# Patient Record
Sex: FEMALE | Race: BLACK OR AFRICAN AMERICAN | Employment: UNEMPLOYED | URBAN - METROPOLITAN AREA
[De-identification: names, ages, dates, MRNs, and addresses within clinical notes are randomized per-mention and may not be internally consistent; named-entity substitution may affect disease eponyms.]

---

## 2017-03-01 PROBLEM — R22.32 AXILLARY MASS, LEFT: Status: ACTIVE | Noted: 2017-03-01

## 2017-03-01 PROBLEM — N63.0 BREAST MASS IN FEMALE: Status: ACTIVE | Noted: 2017-03-01

## 2017-03-02 PROCEDURE — 88360 TUMOR IMMUNOHISTOCHEM/MANUAL: CPT | Performed by: RADIOLOGY

## 2017-03-02 PROCEDURE — 88305 TISSUE EXAM BY PATHOLOGIST: CPT | Performed by: RADIOLOGY

## 2017-03-02 PROCEDURE — 88377 M/PHMTRC ALYS ISHQUANT/SEMIQ: CPT | Performed by: RADIOLOGY

## 2017-03-03 PROBLEM — C50.912 BREAST CANCER, STAGE 3, LEFT (HCC): Status: ACTIVE | Noted: 2017-03-03

## 2017-03-14 PROBLEM — C50.912 MALIGNANT NEOPLASM OF LEFT FEMALE BREAST, UNSPECIFIED SITE OF BREAST: Status: ACTIVE | Noted: 2017-03-14

## 2017-03-20 ENCOUNTER — APPOINTMENT (OUTPATIENT)
Dept: GENERAL RADIOLOGY | Facility: HOSPITAL | Age: 42
End: 2017-03-20
Attending: SURGERY
Payer: COMMERCIAL

## 2017-03-20 ENCOUNTER — SURGERY (OUTPATIENT)
Age: 42
End: 2017-03-20

## 2017-03-20 ENCOUNTER — ANESTHESIA (OUTPATIENT)
Dept: SURGERY | Facility: HOSPITAL | Age: 42
End: 2017-03-20
Payer: COMMERCIAL

## 2017-03-20 ENCOUNTER — ANESTHESIA EVENT (OUTPATIENT)
Dept: SURGERY | Facility: HOSPITAL | Age: 42
End: 2017-03-20
Payer: COMMERCIAL

## 2017-03-20 ENCOUNTER — HOSPITAL ENCOUNTER (OUTPATIENT)
Facility: HOSPITAL | Age: 42
Setting detail: HOSPITAL OUTPATIENT SURGERY
Discharge: HOME | End: 2017-03-20
Attending: SURGERY | Admitting: SURGERY
Payer: COMMERCIAL

## 2017-03-20 VITALS
OXYGEN SATURATION: 97 % | SYSTOLIC BLOOD PRESSURE: 142 MMHG | TEMPERATURE: 98 F | HEART RATE: 56 BPM | HEIGHT: 68 IN | BODY MASS INDEX: 44.41 KG/M2 | RESPIRATION RATE: 16 BRPM | DIASTOLIC BLOOD PRESSURE: 55 MMHG | WEIGHT: 293 LBS

## 2017-03-20 DIAGNOSIS — C50.912 MALIGNANT NEOPLASM OF LEFT FEMALE BREAST, UNSPECIFIED SITE OF BREAST: Primary | ICD-10-CM

## 2017-03-20 LAB — B-HCG UR QL: NEGATIVE

## 2017-03-20 PROCEDURE — 0JH60XZ INSERTION OF TUNNELED VASCULAR ACCESS DEVICE INTO CHEST SUBCUTANEOUS TISSUE AND FASCIA, OPEN APPROACH: ICD-10-PCS | Performed by: SURGERY

## 2017-03-20 PROCEDURE — 81025 URINE PREGNANCY TEST: CPT

## 2017-03-20 PROCEDURE — B5181ZA FLUOROSCOPY OF SUPERIOR VENA CAVA USING LOW OSMOLAR CONTRAST, GUIDANCE: ICD-10-PCS | Performed by: SURGERY

## 2017-03-20 PROCEDURE — 77001 FLUOROGUIDE FOR VEIN DEVICE: CPT

## 2017-03-20 PROCEDURE — 71010 XR CHEST AP PORTABLE  (CPT=71010): CPT

## 2017-03-20 PROCEDURE — B548ZZA ULTRASONOGRAPHY OF SUPERIOR VENA CAVA, GUIDANCE: ICD-10-PCS | Performed by: SURGERY

## 2017-03-20 PROCEDURE — 02HV33Z INSERTION OF INFUSION DEVICE INTO SUPERIOR VENA CAVA, PERCUTANEOUS APPROACH: ICD-10-PCS | Performed by: SURGERY

## 2017-03-20 DEVICE — 8F PLASTIC DIGNITY® MID-SIZED CT PORT W/ATTACHABLE CHRONOFLEX® POLYURETHANE CATHETER
Type: IMPLANTABLE DEVICE | Site: CHEST | Status: FUNCTIONAL
Brand: DIGNITY® MID-SIZED CT PORT

## 2017-03-20 RX ORDER — HYDROMORPHONE HYDROCHLORIDE 1 MG/ML
0.4 INJECTION, SOLUTION INTRAMUSCULAR; INTRAVENOUS; SUBCUTANEOUS EVERY 5 MIN PRN
Status: DISCONTINUED | OUTPATIENT
Start: 2017-03-20 | End: 2017-03-20

## 2017-03-20 RX ORDER — HYDROMORPHONE HYDROCHLORIDE 1 MG/ML
0.6 INJECTION, SOLUTION INTRAMUSCULAR; INTRAVENOUS; SUBCUTANEOUS EVERY 5 MIN PRN
Status: DISCONTINUED | OUTPATIENT
Start: 2017-03-20 | End: 2017-03-20

## 2017-03-20 RX ORDER — MORPHINE SULFATE 4 MG/ML
4 INJECTION, SOLUTION INTRAMUSCULAR; INTRAVENOUS EVERY 10 MIN PRN
Status: DISCONTINUED | OUTPATIENT
Start: 2017-03-20 | End: 2017-03-20

## 2017-03-20 RX ORDER — SODIUM CHLORIDE, SODIUM LACTATE, POTASSIUM CHLORIDE, CALCIUM CHLORIDE 600; 310; 30; 20 MG/100ML; MG/100ML; MG/100ML; MG/100ML
INJECTION, SOLUTION INTRAVENOUS CONTINUOUS
Status: DISCONTINUED | OUTPATIENT
Start: 2017-03-20 | End: 2017-03-20

## 2017-03-20 RX ORDER — HALOPERIDOL 5 MG/ML
0.25 INJECTION INTRAMUSCULAR ONCE AS NEEDED
Status: DISCONTINUED | OUTPATIENT
Start: 2017-03-20 | End: 2017-03-20

## 2017-03-20 RX ORDER — ONDANSETRON 2 MG/ML
4 INJECTION INTRAMUSCULAR; INTRAVENOUS ONCE AS NEEDED
Status: DISCONTINUED | OUTPATIENT
Start: 2017-03-20 | End: 2017-03-20

## 2017-03-20 RX ORDER — NALOXONE HYDROCHLORIDE 0.4 MG/ML
80 INJECTION, SOLUTION INTRAMUSCULAR; INTRAVENOUS; SUBCUTANEOUS AS NEEDED
Status: DISCONTINUED | OUTPATIENT
Start: 2017-03-20 | End: 2017-03-20

## 2017-03-20 RX ORDER — MORPHINE SULFATE 2 MG/ML
2 INJECTION, SOLUTION INTRAMUSCULAR; INTRAVENOUS EVERY 10 MIN PRN
Status: DISCONTINUED | OUTPATIENT
Start: 2017-03-20 | End: 2017-03-20

## 2017-03-20 RX ORDER — ONDANSETRON 2 MG/ML
INJECTION INTRAMUSCULAR; INTRAVENOUS AS NEEDED
Status: DISCONTINUED | OUTPATIENT
Start: 2017-03-20 | End: 2017-03-20 | Stop reason: SURG

## 2017-03-20 RX ORDER — LIDOCAINE HYDROCHLORIDE 10 MG/ML
INJECTION, SOLUTION EPIDURAL; INFILTRATION; INTRACAUDAL; PERINEURAL AS NEEDED
Status: DISCONTINUED | OUTPATIENT
Start: 2017-03-20 | End: 2017-03-20 | Stop reason: HOSPADM

## 2017-03-20 RX ORDER — HYDROCODONE BITARTRATE AND ACETAMINOPHEN 5; 325 MG/1; MG/1
2 TABLET ORAL AS NEEDED
Status: DISCONTINUED | OUTPATIENT
Start: 2017-03-20 | End: 2017-03-20

## 2017-03-20 RX ORDER — MIDAZOLAM HYDROCHLORIDE 1 MG/ML
INJECTION INTRAMUSCULAR; INTRAVENOUS AS NEEDED
Status: DISCONTINUED | OUTPATIENT
Start: 2017-03-20 | End: 2017-03-20 | Stop reason: SURG

## 2017-03-20 RX ORDER — HYDROMORPHONE HYDROCHLORIDE 1 MG/ML
0.2 INJECTION, SOLUTION INTRAMUSCULAR; INTRAVENOUS; SUBCUTANEOUS EVERY 5 MIN PRN
Status: DISCONTINUED | OUTPATIENT
Start: 2017-03-20 | End: 2017-03-20

## 2017-03-20 RX ORDER — MORPHINE SULFATE 4 MG/ML
6 INJECTION, SOLUTION INTRAMUSCULAR; INTRAVENOUS EVERY 2 HOUR PRN
Status: CANCELLED | OUTPATIENT
Start: 2017-03-20

## 2017-03-20 RX ORDER — METOCLOPRAMIDE HYDROCHLORIDE 5 MG/ML
10 INJECTION INTRAMUSCULAR; INTRAVENOUS EVERY 6 HOURS PRN
Status: CANCELLED | OUTPATIENT
Start: 2017-03-20

## 2017-03-20 RX ORDER — MAGNESIUM HYDROXIDE 1200 MG/15ML
LIQUID ORAL CONTINUOUS PRN
Status: DISCONTINUED | OUTPATIENT
Start: 2017-03-20 | End: 2017-03-20

## 2017-03-20 RX ORDER — MORPHINE SULFATE 10 MG/ML
6 INJECTION, SOLUTION INTRAMUSCULAR; INTRAVENOUS EVERY 10 MIN PRN
Status: DISCONTINUED | OUTPATIENT
Start: 2017-03-20 | End: 2017-03-20

## 2017-03-20 RX ORDER — ACETAMINOPHEN 325 MG/1
650 TABLET ORAL ONCE
Status: COMPLETED | OUTPATIENT
Start: 2017-03-20 | End: 2017-03-20

## 2017-03-20 RX ORDER — BUPIVACAINE HYDROCHLORIDE AND EPINEPHRINE 2.5; 5 MG/ML; UG/ML
INJECTION, SOLUTION INFILTRATION; PERINEURAL AS NEEDED
Status: DISCONTINUED | OUTPATIENT
Start: 2017-03-20 | End: 2017-03-20 | Stop reason: HOSPADM

## 2017-03-20 RX ORDER — ONDANSETRON 2 MG/ML
8 INJECTION INTRAMUSCULAR; INTRAVENOUS EVERY 6 HOURS PRN
Status: CANCELLED | OUTPATIENT
Start: 2017-03-20

## 2017-03-20 RX ORDER — MORPHINE SULFATE 4 MG/ML
4 INJECTION, SOLUTION INTRAMUSCULAR; INTRAVENOUS EVERY 2 HOUR PRN
Status: CANCELLED | OUTPATIENT
Start: 2017-03-20

## 2017-03-20 RX ORDER — HYDROCODONE BITARTRATE AND ACETAMINOPHEN 5; 325 MG/1; MG/1
1 TABLET ORAL AS NEEDED
Status: DISCONTINUED | OUTPATIENT
Start: 2017-03-20 | End: 2017-03-20

## 2017-03-20 RX ORDER — ONDANSETRON 2 MG/ML
4 INJECTION INTRAMUSCULAR; INTRAVENOUS ONCE AS NEEDED
Status: COMPLETED | OUTPATIENT
Start: 2017-03-20 | End: 2017-03-20

## 2017-03-20 RX ORDER — TRAMADOL HYDROCHLORIDE 50 MG/1
100 TABLET ORAL EVERY 6 HOURS PRN
Status: CANCELLED | OUTPATIENT
Start: 2017-03-20

## 2017-03-20 RX ORDER — LIDOCAINE HYDROCHLORIDE 10 MG/ML
INJECTION, SOLUTION EPIDURAL; INFILTRATION; INTRACAUDAL; PERINEURAL AS NEEDED
Status: DISCONTINUED | OUTPATIENT
Start: 2017-03-20 | End: 2017-03-20 | Stop reason: SURG

## 2017-03-20 RX ORDER — METOCLOPRAMIDE 10 MG/1
10 TABLET ORAL ONCE
Status: COMPLETED | OUTPATIENT
Start: 2017-03-20 | End: 2017-03-20

## 2017-03-20 RX ORDER — KETAMINE HCL IN 0.9 % NACL 100MG/10ML
SYRINGE (ML) INTRAVENOUS AS NEEDED
Status: DISCONTINUED | OUTPATIENT
Start: 2017-03-20 | End: 2017-03-20 | Stop reason: SURG

## 2017-03-20 RX ORDER — MORPHINE SULFATE 2 MG/ML
2 INJECTION, SOLUTION INTRAMUSCULAR; INTRAVENOUS EVERY 2 HOUR PRN
Status: CANCELLED | OUTPATIENT
Start: 2017-03-20

## 2017-03-20 RX ORDER — DEXAMETHASONE SODIUM PHOSPHATE 4 MG/ML
VIAL (ML) INJECTION AS NEEDED
Status: DISCONTINUED | OUTPATIENT
Start: 2017-03-20 | End: 2017-03-20 | Stop reason: SURG

## 2017-03-20 RX ORDER — TRAMADOL HYDROCHLORIDE 50 MG/1
TABLET ORAL EVERY 6 HOURS PRN
Qty: 40 TABLET | Refills: 0 | Status: SHIPPED | OUTPATIENT
Start: 2017-03-20 | End: 2017-04-28

## 2017-03-20 RX ORDER — FAMOTIDINE 20 MG/1
20 TABLET ORAL ONCE
Status: COMPLETED | OUTPATIENT
Start: 2017-03-20 | End: 2017-03-20

## 2017-03-20 RX ADMIN — SODIUM CHLORIDE, SODIUM LACTATE, POTASSIUM CHLORIDE, CALCIUM CHLORIDE: 600; 310; 30; 20 INJECTION, SOLUTION INTRAVENOUS at 14:20:00

## 2017-03-20 RX ADMIN — SODIUM CHLORIDE, SODIUM LACTATE, POTASSIUM CHLORIDE, CALCIUM CHLORIDE: 600; 310; 30; 20 INJECTION, SOLUTION INTRAVENOUS at 14:45:00

## 2017-03-20 RX ADMIN — SODIUM CHLORIDE, SODIUM LACTATE, POTASSIUM CHLORIDE, CALCIUM CHLORIDE: 600; 310; 30; 20 INJECTION, SOLUTION INTRAVENOUS at 13:02:00

## 2017-03-20 RX ADMIN — DEXAMETHASONE SODIUM PHOSPHATE 4 MG: 4 MG/ML VIAL (ML) INJECTION at 13:15:00

## 2017-03-20 RX ADMIN — LIDOCAINE HYDROCHLORIDE 30 MG: 10 INJECTION, SOLUTION EPIDURAL; INFILTRATION; INTRACAUDAL; PERINEURAL at 13:05:00

## 2017-03-20 RX ADMIN — MIDAZOLAM HYDROCHLORIDE 0.5 MG: 1 INJECTION INTRAMUSCULAR; INTRAVENOUS at 14:11:00

## 2017-03-20 RX ADMIN — KETAMINE HCL IN 0.9 % NACL 10 MG: 100MG/10ML SYRINGE (ML) INTRAVENOUS at 14:05:00

## 2017-03-20 RX ADMIN — MIDAZOLAM HYDROCHLORIDE 0.5 MG: 1 INJECTION INTRAMUSCULAR; INTRAVENOUS at 13:40:00

## 2017-03-20 RX ADMIN — MIDAZOLAM HYDROCHLORIDE 2 MG: 1 INJECTION INTRAMUSCULAR; INTRAVENOUS at 13:03:00

## 2017-03-20 RX ADMIN — ONDANSETRON 4 MG: 2 INJECTION INTRAMUSCULAR; INTRAVENOUS at 13:15:00

## 2017-03-20 NOTE — BRIEF OP NOTE
[facility] OPERATING ROOM  Brief Op Note     Prachi Schmidt Location: OR   CSN [id number] MRN [id number]   Admission Date 3/20/2017 Operation Date 3/20/2017   Attending Physician Edvin Marlow MD Operating Physician Rusty Stoddard MD

## 2017-03-20 NOTE — ANESTHESIA POSTPROCEDURE EVALUATION
Patient: Miguelito Rivas    Procedure Summary     Date Anesthesia Start Anesthesia Stop Room / Location    03/20/17 1303  [facility] MAIN OR 08 / [facility] MAIN OR       Procedure Diagnosis Surgeon Responsible Provider    CATHETER INSERTION PORT-A-CATH (N/A ) (breast ca

## 2017-03-20 NOTE — ANESTHESIA PREPROCEDURE EVALUATION
Anesthesia PreOp Note    HPI:     Davi Whaley is a 46year old female who presents for preoperative consultation requested by: Pranay Pinon MD    Date of Surgery: 3/20/2017    Procedure(s):  CATHETER INSERTION PORT-A-CATH  Indication: breast canc by mouth every 6 (six) hours as needed for Nausea.  Every 6 hours prn nausea or as instructed by physician Disp: 30 tablet Rfl: 3 Unknown at Unknown time   dexamethasone (DECADRON) 4 MG tablet Cycle 1-4: 2 tabs in AM day after chemo, then 2 tabs AM and PM f 13.1 03/16/2017    03/16/2017   URINEPREG Negative 03/20/2017       Lab Results  Component Value Date    03/16/2017   K 4.1 03/16/2017    03/16/2017   CO2 24.7 03/16/2017   BUN 13 03/16/2017   CREATSERUM 1.07* 03/16/2017   * 03/16

## 2017-03-20 NOTE — H&P
Patient presents with: Follow - Up: f/u breast. (diag mammo rt/2nd look US rght brst has been ordered). Pt would like to know would like to know why mammo on right breast.    HPI:     Tatiana Iglesias is a 46year old [address] female.  The patient presents to Mitoses: 3). -Longest length of invasive carcinoma 11 mm; 50% of submitted tissue. B. Left breast, axillary lymph node, ultrasound-guided biopsy:  -Invasive ductal carcinoma.  -[place] grade 3 (Tubules: 3, Nuclear: 2, Mitoses: 3).    -Longest length jacob metastases in the left axilla. 3. Small jacob metastasis in the left supraclavicular space. 4. Hypermetabolic uptake in the region of the right adnexa. This is suspicious for either a  synchronous or metastatic lesion.  Further evaluation with pelvi unusual skin lesions or rashes  EYES: no visual complaints or deficits  HEENT: denies nasal congestion, sinus pain or sore throat; hearing loss negative RESPIRATORY: denies shortness of breath, wheezing or cough    CARDIOVASCULAR: denies chest pain or LUKE; ductal carcinoma of the left breast as well as positive axillary metastasis.  An extensive discussion with the patient is etiology of the above.  Discussed the complete workup.  Patient did see genetic counseling and genetic testing has been drawn.  These a and wishes to proceed with port placement.  I reviewed patient education material with the patient.  Will plan insertion of subcutaneous port under IV sedation anesthesia at [facility] on Monday, March 20, 2017.  Due to patient's BMI of greater 45 th

## 2017-03-20 NOTE — INTERVAL H&P NOTE
Pre-op Diagnosis: breast cancer    The above referenced H&P was reviewed by Gabino Agarwal MD on 3/20/2017, the patient was examined and no significant changes have occurred in the patient's condition since the H&P was performed.   I discussed with the

## 2017-03-21 PROBLEM — C50.912 BREAST CANCER, STAGE 3, LEFT (HCC): Status: ACTIVE | Noted: 2017-03-21

## 2017-03-21 PROBLEM — E66.01 MORBID OBESITY DUE TO EXCESS CALORIES (HCC): Status: ACTIVE | Noted: 2017-03-21

## 2017-03-21 PROBLEM — R22.32 AXILLARY MASS, LEFT: Status: ACTIVE | Noted: 2017-03-21

## 2017-03-21 PROBLEM — D25.9 UTERINE LEIOMYOMA, UNSPECIFIED LOCATION: Status: ACTIVE | Noted: 2017-03-21

## 2017-03-21 PROBLEM — N83.201 CYST OF RIGHT OVARY: Status: ACTIVE | Noted: 2017-03-21

## 2017-03-21 NOTE — OPERATIVE REPORT
[facility]    PATIENT'S NAME: Ericka Alfredito COREY   ATTENDING PHYSICIAN: Pat Moore MD   OPERATING PHYSICIAN: Pat Moore MD   PATIENT ACCOUNT#:   [de-identified]    LOCATION:  [place] [address]  MEDICAL RECORD #:   [id number] The right deltopectoral groove was identified and there was no evidence of cephalic vein present in this area. Next, the right internal jugular vein was visualized with ultrasound.   Using routine Seldinger technique, the area was anesthetized and the guid irrigated with heparin and saline solution. Sterile dressings were applied to both wounds. The patient was transferred off the operative table and taken to the recovery room, extubated in stable condition. All counts were correct at the end of the case.

## 2017-03-22 PROCEDURE — 88341 IMHCHEM/IMCYTCHM EA ADD ANTB: CPT | Performed by: RADIOLOGY

## 2017-03-22 PROCEDURE — 88344 IMHCHEM/IMCYTCHM EA MLT ANTB: CPT | Performed by: RADIOLOGY

## 2017-03-22 PROCEDURE — 88305 TISSUE EXAM BY PATHOLOGIST: CPT | Performed by: RADIOLOGY

## 2017-03-22 PROCEDURE — 88342 IMHCHEM/IMCYTCHM 1ST ANTB: CPT | Performed by: RADIOLOGY

## 2017-04-28 PROCEDURE — 81003 URINALYSIS AUTO W/O SCOPE: CPT | Performed by: INTERNAL MEDICINE

## 2017-05-10 ENCOUNTER — HOSPITAL ENCOUNTER (OUTPATIENT)
Dept: CV DIAGNOSTICS | Facility: HOSPITAL | Age: 42
Discharge: HOME | End: 2017-05-10
Attending: INTERNAL MEDICINE
Payer: COMMERCIAL

## 2017-05-10 DIAGNOSIS — C50.912 BREAST CANCER, STAGE 3, LEFT (HCC): ICD-10-CM

## 2017-05-10 DIAGNOSIS — C50.912 MALIGNANT NEOPLASM OF LEFT FEMALE BREAST (HCC): ICD-10-CM

## 2017-05-10 PROCEDURE — 93306 TTE W/DOPPLER COMPLETE: CPT | Performed by: INTERNAL MEDICINE

## 2017-05-18 PROBLEM — C50.912 MALIGNANT NEOPLASM OF LEFT FEMALE BREAST (HCC): Status: ACTIVE | Noted: 2017-03-14

## 2017-06-08 PROBLEM — C50.912 BREAST CANCER, STAGE 3, LEFT (HCC): Status: ACTIVE | Noted: 2017-06-08

## 2017-06-08 PROBLEM — C50.912 HER2-POSITIVE CARCINOMA OF LEFT BREAST (HCC): Status: ACTIVE | Noted: 2017-06-08

## 2017-06-08 PROBLEM — R22.32 AXILLARY MASS, LEFT: Status: ACTIVE | Noted: 2017-06-08

## 2017-06-08 PROBLEM — D64.81 ANEMIA DUE TO ANTINEOPLASTIC CHEMOTHERAPY: Status: ACTIVE | Noted: 2017-06-08

## 2017-06-08 PROBLEM — T45.1X5A ANEMIA DUE TO ANTINEOPLASTIC CHEMOTHERAPY: Status: ACTIVE | Noted: 2017-06-08

## 2017-06-08 PROBLEM — C50.912 MALIGNANT NEOPLASM OF LEFT FEMALE BREAST (HCC): Status: ACTIVE | Noted: 2017-06-08

## 2017-08-09 ENCOUNTER — OFFICE VISIT (OUTPATIENT)
Dept: PHYSICAL THERAPY | Facility: HOSPITAL | Age: 42
End: 2017-08-09
Attending: SURGERY
Payer: COMMERCIAL

## 2017-08-09 NOTE — PATIENT INSTRUCTIONS
ARM EXERCISES AFTER BREAST SURGERY:    When you begin the exercise program, you will find that you may tire easily and there will be some discomfort as you attempt to perform the movements.  However, you should continue to perform them to the point of sligh If you cannot perform your full range of motion approximately 8 weeks after your surgery, ask your surgeon if a referral to Physical Therapy is appropriate. It is important to your recovery to regain your range of motion early in your recovery.    Immediate Erica Leon Breast Cancer Treatment Handbook: The Comprehensive Patient Navigation Guide. 8th Edition. [employer] 2014.  ISBN-13:222-3-46357-09-5  HEP2go for illustrations

## 2017-08-09 NOTE — PROGRESS NOTES
BREAST CANCER SURGICAL SCREENINGS  [facility]      PATIENT SUMMARY:     Involved Side:           LEFT                                         Dominant Hand:         RIGHT                         Occupation:           Instructional ass flex     flex      abd 5 5   abd     abd      ext 5 5   ext     ext      ER 5 4+   ER     ER      IR 5 5   IR     IR                                     Elbow  AROM R/L MMT R/L  Elbow  AROM R/L MMT R/L  Elbow  AROM R/L MMT R/L    flex wfl 5   flex     flex MEASURED 8/9/2017   LOCATION/MEASUREMENTS RUE   PATIENT POSITION  SUPINE w/1 pillow   LIMB POSITION 75 deg abduction   STARTING POINT 17cm from 3rd cuticle   MEASUREMENT A 17   MEASUREMENT B 20.5   MEASUREMENT C 24.5   MEASUREMENT D 29.5   MEASUREMENT E 32

## 2017-08-31 PROBLEM — S31.831A ANAL TEAR: Status: ACTIVE | Noted: 2017-08-31

## 2017-09-27 PROCEDURE — 87081 CULTURE SCREEN ONLY: CPT | Performed by: INTERNAL MEDICINE

## 2017-09-27 PROCEDURE — 87624 HPV HI-RISK TYP POOLED RSLT: CPT | Performed by: OBSTETRICS & GYNECOLOGY

## 2017-09-27 PROCEDURE — 88175 CYTOPATH C/V AUTO FLUID REDO: CPT | Performed by: OBSTETRICS & GYNECOLOGY

## 2017-10-02 PROBLEM — S36.69XA RECTAL TEAR: Status: ACTIVE | Noted: 2017-10-02

## 2017-10-02 PROBLEM — N63.0 BREAST MASS IN FEMALE: Status: ACTIVE | Noted: 2017-10-02

## 2017-10-02 PROBLEM — C50.912 HER2-POSITIVE CARCINOMA OF LEFT BREAST (HCC): Status: ACTIVE | Noted: 2017-10-02

## 2017-10-02 PROBLEM — D64.9 ANEMIA, UNSPECIFIED TYPE: Status: ACTIVE | Noted: 2017-10-02

## 2017-10-02 PROBLEM — C50.912 BREAST CANCER, STAGE 3, LEFT (HCC): Status: ACTIVE | Noted: 2017-10-02

## 2017-10-02 PROCEDURE — 82607 VITAMIN B-12: CPT | Performed by: INTERNAL MEDICINE

## 2017-10-02 PROCEDURE — 82746 ASSAY OF FOLIC ACID SERUM: CPT | Performed by: INTERNAL MEDICINE

## 2017-10-11 ENCOUNTER — LAB ENCOUNTER (OUTPATIENT)
Dept: LAB | Age: 42
End: 2017-10-11
Attending: INTERNAL MEDICINE
Payer: COMMERCIAL

## 2017-10-11 DIAGNOSIS — Z01.818 PREOP EXAMINATION: Primary | ICD-10-CM

## 2017-10-11 PROCEDURE — 83540 ASSAY OF IRON: CPT | Performed by: INTERNAL MEDICINE

## 2017-10-11 PROCEDURE — 82746 ASSAY OF FOLIC ACID SERUM: CPT | Performed by: INTERNAL MEDICINE

## 2017-10-11 PROCEDURE — 81025 URINE PREGNANCY TEST: CPT

## 2017-10-11 PROCEDURE — 80053 COMPREHEN METABOLIC PANEL: CPT | Performed by: INTERNAL MEDICINE

## 2017-10-11 PROCEDURE — 87086 URINE CULTURE/COLONY COUNT: CPT | Performed by: INTERNAL MEDICINE

## 2017-10-11 PROCEDURE — 36415 COLL VENOUS BLD VENIPUNCTURE: CPT | Performed by: INTERNAL MEDICINE

## 2017-10-11 PROCEDURE — 82607 VITAMIN B-12: CPT | Performed by: INTERNAL MEDICINE

## 2017-10-11 PROCEDURE — 81001 URINALYSIS AUTO W/SCOPE: CPT | Performed by: INTERNAL MEDICINE

## 2017-10-11 PROCEDURE — 83550 IRON BINDING TEST: CPT | Performed by: INTERNAL MEDICINE

## 2017-10-11 PROCEDURE — 85025 COMPLETE CBC W/AUTO DIFF WBC: CPT | Performed by: SURGERY

## 2017-10-13 PROBLEM — D50.0 IRON DEFICIENCY ANEMIA DUE TO CHRONIC BLOOD LOSS: Status: ACTIVE | Noted: 2017-10-13

## 2017-10-23 ENCOUNTER — HOSPITAL ENCOUNTER (OUTPATIENT)
Dept: ULTRASOUND IMAGING | Facility: HOSPITAL | Age: 42
Discharge: HOME | End: 2017-10-23
Attending: SURGERY
Payer: COMMERCIAL

## 2017-10-23 DIAGNOSIS — C50.912 BREAST CANCER, STAGE 3, LEFT (HCC): ICD-10-CM

## 2017-10-23 PROCEDURE — 76882 US LMTD JT/FCL EVL NVASC XTR: CPT | Performed by: SURGERY

## 2017-10-26 ENCOUNTER — HOSPITAL ENCOUNTER (OUTPATIENT)
Facility: HOSPITAL | Age: 42
Setting detail: HOSPITAL OUTPATIENT SURGERY
Discharge: HOME | End: 2017-10-26
Attending: SURGERY | Admitting: SURGERY
Payer: COMMERCIAL

## 2017-10-26 ENCOUNTER — HOSPITAL ENCOUNTER (OUTPATIENT)
Dept: MAMMOGRAPHY | Facility: HOSPITAL | Age: 42
Discharge: HOME | End: 2017-10-26
Attending: SURGERY
Payer: COMMERCIAL

## 2017-10-26 ENCOUNTER — APPOINTMENT (OUTPATIENT)
Dept: MAMMOGRAPHY | Facility: HOSPITAL | Age: 42
End: 2017-10-26
Attending: SURGERY
Payer: COMMERCIAL

## 2017-10-26 ENCOUNTER — HOSPITAL ENCOUNTER (OUTPATIENT)
Dept: NUCLEAR MEDICINE | Facility: HOSPITAL | Age: 42
Discharge: HOME | End: 2017-10-26
Attending: SURGERY
Payer: COMMERCIAL

## 2017-10-26 ENCOUNTER — ANESTHESIA (OUTPATIENT)
Dept: SURGERY | Facility: HOSPITAL | Age: 42
End: 2017-10-26
Payer: COMMERCIAL

## 2017-10-26 ENCOUNTER — HOSPITAL ENCOUNTER (OUTPATIENT)
Dept: ULTRASOUND IMAGING | Facility: HOSPITAL | Age: 42
Discharge: HOME | End: 2017-10-26
Attending: SURGERY
Payer: COMMERCIAL

## 2017-10-26 ENCOUNTER — SURGERY (OUTPATIENT)
Age: 42
End: 2017-10-26

## 2017-10-26 ENCOUNTER — ANESTHESIA EVENT (OUTPATIENT)
Dept: SURGERY | Facility: HOSPITAL | Age: 42
End: 2017-10-26
Payer: COMMERCIAL

## 2017-10-26 VITALS
TEMPERATURE: 98 F | HEIGHT: 68 IN | SYSTOLIC BLOOD PRESSURE: 113 MMHG | DIASTOLIC BLOOD PRESSURE: 62 MMHG | HEART RATE: 78 BPM | OXYGEN SATURATION: 98 % | BODY MASS INDEX: 40.47 KG/M2 | RESPIRATION RATE: 16 BRPM | WEIGHT: 267 LBS

## 2017-10-26 VITALS
HEART RATE: 67 BPM | SYSTOLIC BLOOD PRESSURE: 134 MMHG | OXYGEN SATURATION: 100 % | RESPIRATION RATE: 16 BRPM | DIASTOLIC BLOOD PRESSURE: 74 MMHG

## 2017-10-26 DIAGNOSIS — C50.912 BREAST CANCER, LEFT BREAST (HCC): ICD-10-CM

## 2017-10-26 DIAGNOSIS — C50.912 BREAST CANCER, STAGE 3, LEFT (HCC): ICD-10-CM

## 2017-10-26 DIAGNOSIS — C50.912 BREAST CANCER, STAGE 3, LEFT (HCC): Primary | ICD-10-CM

## 2017-10-26 PROCEDURE — 78195 LYMPH SYSTEM IMAGING: CPT | Performed by: SURGERY

## 2017-10-26 PROCEDURE — 81025 URINE PREGNANCY TEST: CPT

## 2017-10-26 PROCEDURE — 85025 COMPLETE CBC W/AUTO DIFF WBC: CPT | Performed by: SURGERY

## 2017-10-26 PROCEDURE — 36415 COLL VENOUS BLD VENIPUNCTURE: CPT | Performed by: SURGERY

## 2017-10-26 PROCEDURE — 88341 IMHCHEM/IMCYTCHM EA ADD ANTB: CPT | Performed by: SURGERY

## 2017-10-26 PROCEDURE — 88309 TISSUE EXAM BY PATHOLOGIST: CPT | Performed by: SURGERY

## 2017-10-26 PROCEDURE — 07B60ZX EXCISION OF LEFT AXILLARY LYMPHATIC, OPEN APPROACH, DIAGNOSTIC: ICD-10-PCS | Performed by: SURGERY

## 2017-10-26 PROCEDURE — 0HBU0ZZ EXCISION OF LEFT BREAST, OPEN APPROACH: ICD-10-PCS | Performed by: SURGERY

## 2017-10-26 PROCEDURE — 19285 PERQ DEV BREAST 1ST US IMAG: CPT | Performed by: SURGERY

## 2017-10-26 PROCEDURE — 19281 PERQ DEVICE BREAST 1ST IMAG: CPT | Performed by: SURGERY

## 2017-10-26 PROCEDURE — 88307 TISSUE EXAM BY PATHOLOGIST: CPT | Performed by: SURGERY

## 2017-10-26 PROCEDURE — 77065 DX MAMMO INCL CAD UNI: CPT | Performed by: SURGERY

## 2017-10-26 PROCEDURE — 76098 X-RAY EXAM SURGICAL SPECIMEN: CPT | Performed by: SURGERY

## 2017-10-26 PROCEDURE — 88342 IMHCHEM/IMCYTCHM 1ST ANTB: CPT | Performed by: SURGERY

## 2017-10-26 RX ORDER — SODIUM CHLORIDE, SODIUM LACTATE, POTASSIUM CHLORIDE, CALCIUM CHLORIDE 600; 310; 30; 20 MG/100ML; MG/100ML; MG/100ML; MG/100ML
INJECTION, SOLUTION INTRAVENOUS CONTINUOUS
Status: DISCONTINUED | OUTPATIENT
Start: 2017-10-26 | End: 2017-10-26

## 2017-10-26 RX ORDER — MORPHINE SULFATE 10 MG/ML
6 INJECTION, SOLUTION INTRAMUSCULAR; INTRAVENOUS EVERY 10 MIN PRN
Status: DISCONTINUED | OUTPATIENT
Start: 2017-10-26 | End: 2017-10-26

## 2017-10-26 RX ORDER — DOCUSATE SODIUM 100 MG/1
100 CAPSULE, LIQUID FILLED ORAL 2 TIMES DAILY
Qty: 14 CAPSULE | Refills: 1 | Status: SHIPPED | OUTPATIENT
Start: 2017-10-26 | End: 2017-11-02

## 2017-10-26 RX ORDER — ONDANSETRON 2 MG/ML
4 INJECTION INTRAMUSCULAR; INTRAVENOUS ONCE AS NEEDED
Status: COMPLETED | OUTPATIENT
Start: 2017-10-26 | End: 2017-10-26

## 2017-10-26 RX ORDER — MIDAZOLAM HYDROCHLORIDE 1 MG/ML
INJECTION INTRAMUSCULAR; INTRAVENOUS AS NEEDED
Status: DISCONTINUED | OUTPATIENT
Start: 2017-10-26 | End: 2017-10-26 | Stop reason: SURG

## 2017-10-26 RX ORDER — LIDOCAINE HYDROCHLORIDE 10 MG/ML
INJECTION, SOLUTION EPIDURAL; INFILTRATION; INTRACAUDAL; PERINEURAL AS NEEDED
Status: DISCONTINUED | OUTPATIENT
Start: 2017-10-26 | End: 2017-10-26 | Stop reason: SURG

## 2017-10-26 RX ORDER — METOCLOPRAMIDE 10 MG/1
10 TABLET ORAL ONCE
Status: COMPLETED | OUTPATIENT
Start: 2017-10-26 | End: 2017-10-26

## 2017-10-26 RX ORDER — ONDANSETRON 2 MG/ML
INJECTION INTRAMUSCULAR; INTRAVENOUS AS NEEDED
Status: DISCONTINUED | OUTPATIENT
Start: 2017-10-26 | End: 2017-10-26 | Stop reason: SURG

## 2017-10-26 RX ORDER — HYDROCODONE BITARTRATE AND ACETAMINOPHEN 5; 325 MG/1; MG/1
2 TABLET ORAL AS NEEDED
Status: DISCONTINUED | OUTPATIENT
Start: 2017-10-26 | End: 2017-10-26

## 2017-10-26 RX ORDER — HYDROMORPHONE HYDROCHLORIDE 1 MG/ML
0.4 INJECTION, SOLUTION INTRAMUSCULAR; INTRAVENOUS; SUBCUTANEOUS EVERY 5 MIN PRN
Status: DISCONTINUED | OUTPATIENT
Start: 2017-10-26 | End: 2017-10-26

## 2017-10-26 RX ORDER — HYDROCODONE BITARTRATE AND ACETAMINOPHEN 5; 325 MG/1; MG/1
1 TABLET ORAL AS NEEDED
Status: DISCONTINUED | OUTPATIENT
Start: 2017-10-26 | End: 2017-10-26

## 2017-10-26 RX ORDER — HALOPERIDOL 5 MG/ML
0.25 INJECTION INTRAMUSCULAR ONCE AS NEEDED
Status: COMPLETED | OUTPATIENT
Start: 2017-10-26 | End: 2017-10-26

## 2017-10-26 RX ORDER — ACETAMINOPHEN 500 MG
1000 TABLET ORAL ONCE
Status: COMPLETED | OUTPATIENT
Start: 2017-10-26 | End: 2017-10-26

## 2017-10-26 RX ORDER — MORPHINE SULFATE 2 MG/ML
2 INJECTION, SOLUTION INTRAMUSCULAR; INTRAVENOUS EVERY 10 MIN PRN
Status: DISCONTINUED | OUTPATIENT
Start: 2017-10-26 | End: 2017-10-26

## 2017-10-26 RX ORDER — MORPHINE SULFATE 4 MG/ML
4 INJECTION, SOLUTION INTRAMUSCULAR; INTRAVENOUS EVERY 10 MIN PRN
Status: DISCONTINUED | OUTPATIENT
Start: 2017-10-26 | End: 2017-10-26

## 2017-10-26 RX ORDER — DEXAMETHASONE SODIUM PHOSPHATE 4 MG/ML
VIAL (ML) INJECTION AS NEEDED
Status: DISCONTINUED | OUTPATIENT
Start: 2017-10-26 | End: 2017-10-26 | Stop reason: SURG

## 2017-10-26 RX ORDER — HYDROMORPHONE HYDROCHLORIDE 1 MG/ML
0.2 INJECTION, SOLUTION INTRAMUSCULAR; INTRAVENOUS; SUBCUTANEOUS EVERY 5 MIN PRN
Status: DISCONTINUED | OUTPATIENT
Start: 2017-10-26 | End: 2017-10-26

## 2017-10-26 RX ORDER — ISOSULFAN BLUE 50 MG/5ML
INJECTION, SOLUTION SUBCUTANEOUS AS NEEDED
Status: DISCONTINUED | OUTPATIENT
Start: 2017-10-26 | End: 2017-10-26 | Stop reason: HOSPADM

## 2017-10-26 RX ORDER — HYDROMORPHONE HYDROCHLORIDE 1 MG/ML
0.6 INJECTION, SOLUTION INTRAMUSCULAR; INTRAVENOUS; SUBCUTANEOUS EVERY 5 MIN PRN
Status: DISCONTINUED | OUTPATIENT
Start: 2017-10-26 | End: 2017-10-26

## 2017-10-26 RX ORDER — BUPIVACAINE HYDROCHLORIDE AND EPINEPHRINE 2.5; 5 MG/ML; UG/ML
INJECTION, SOLUTION INFILTRATION; PERINEURAL AS NEEDED
Status: DISCONTINUED | OUTPATIENT
Start: 2017-10-26 | End: 2017-10-26 | Stop reason: HOSPADM

## 2017-10-26 RX ORDER — NALOXONE HYDROCHLORIDE 0.4 MG/ML
80 INJECTION, SOLUTION INTRAMUSCULAR; INTRAVENOUS; SUBCUTANEOUS AS NEEDED
Status: ACTIVE | OUTPATIENT
Start: 2017-10-26 | End: 2017-10-26

## 2017-10-26 RX ORDER — TRAMADOL HYDROCHLORIDE 50 MG/1
TABLET ORAL EVERY 6 HOURS PRN
Qty: 40 TABLET | Refills: 0 | Status: SHIPPED | OUTPATIENT
Start: 2017-10-26 | End: 2017-11-09

## 2017-10-26 RX ORDER — FAMOTIDINE 20 MG/1
20 TABLET ORAL ONCE
Status: COMPLETED | OUTPATIENT
Start: 2017-10-26 | End: 2017-10-26

## 2017-10-26 RX ORDER — EPHEDRINE SULFATE 50 MG/ML
INJECTION, SOLUTION INTRAVENOUS AS NEEDED
Status: DISCONTINUED | OUTPATIENT
Start: 2017-10-26 | End: 2017-10-26 | Stop reason: SURG

## 2017-10-26 RX ADMIN — LIDOCAINE HYDROCHLORIDE 30 MG: 10 INJECTION, SOLUTION EPIDURAL; INFILTRATION; INTRACAUDAL; PERINEURAL at 11:30:00

## 2017-10-26 RX ADMIN — LIDOCAINE HYDROCHLORIDE 20 MG: 10 INJECTION, SOLUTION EPIDURAL; INFILTRATION; INTRACAUDAL; PERINEURAL at 11:31:00

## 2017-10-26 RX ADMIN — SODIUM CHLORIDE, SODIUM LACTATE, POTASSIUM CHLORIDE, CALCIUM CHLORIDE: 600; 310; 30; 20 INJECTION, SOLUTION INTRAVENOUS at 11:25:00

## 2017-10-26 RX ADMIN — EPHEDRINE SULFATE 5 MG: 50 INJECTION, SOLUTION INTRAVENOUS at 12:15:00

## 2017-10-26 RX ADMIN — SODIUM CHLORIDE, SODIUM LACTATE, POTASSIUM CHLORIDE, CALCIUM CHLORIDE: 600; 310; 30; 20 INJECTION, SOLUTION INTRAVENOUS at 13:15:00

## 2017-10-26 RX ADMIN — ONDANSETRON 4 MG: 2 INJECTION INTRAMUSCULAR; INTRAVENOUS at 12:25:00

## 2017-10-26 RX ADMIN — MIDAZOLAM HYDROCHLORIDE 2 MG: 1 INJECTION INTRAMUSCULAR; INTRAVENOUS at 11:23:00

## 2017-10-26 RX ADMIN — DEXAMETHASONE SODIUM PHOSPHATE 4 MG: 4 MG/ML VIAL (ML) INJECTION at 12:25:00

## 2017-10-26 NOTE — CONSULTS
HPI:    Jon Prince is a 38year old [de-identified]  female. The patient presents to the office for new left breast mass. It has been present since 2 weeks. Previous breast procedures include none.    Bilateral breast mammogram And right breast ultrasound 2/28/20 carcinoma.  -[place] grade 3 (Tubules: 3, Nuclear: 2, Mitoses: 3).    -Longest length of invasive carcinoma 11 mm; 60% of submitted tissue.  -Lymphoid tissue not visualized.        Probe  Result  HER2/CEP17 Ratio    HER2-DEANNA FISH  Equivocal  1.12         a  synchronous or metastatic lesion. Further evaluation with pelvic MRI is recommended if indicated  Clinically.        3/29/2017: Patient presents for follow-up evaluation for port insertion. Patient has no new complaints.   Patient received chemotherapy Patient completed chemotherapy 8/31/2017. Patient for preoperative discussion. Patient presented at multidisciplinary breast tumor board.   Recommendation was for consideration of left breast lumpectomy with focused left axillary sentinel lymph node bio years.   Family history of breast or ovarian cancer: maternal great grandmother with breast cancer.        Allergies:     Hydrocodone             Nausea and vomiting  Seasonal                   Current Meds:     Current Outpatient Prescriptions:  folic acid SURGICAL HISTORY      Comment: Dignity port           Family History   Problem Relation Age of Onset   • Cancer Neg     • Diabetes Neg     • Heart Disorder Neg     • Hypertension Neg     • Lipids Neg         Smoking status: Never Smoker LEFT BREAST: large palpable mass of the left breast at the 7:00 location. No other palpable mass present. Breasts are quite large. RIGHT BREAST: no palpable masses present. Nipple: DISCHARGE: none. RETRACTION: none. SKIN CHANGES: none. 7/28/2017:  Claribel Medickamilla postoperatively and this will need to be clarified by radiation therapy as well as plastic surgery. I recommended the patient see Dr. Shanice Spence from plastic surgery for further evaluation and discuss reconstruction options as well as the timing.   I d protocol study. We discussed preventive measures as well. I discussed the need for axillary lymph node dissection if frozen section is positive.   The patient did see plastic surgery and recommending delayed reconstruction due to the need for postoperative same surgery with standard node dissection is sentinel lymph nodes positive.  However, Pt understand significant risk of lymphedema, swelling, pain, numbness, deformity of chest wall, increased risk of infection  Attention will be made during the operation with the patient who understands, consents, and wishes to proceed with surgery.  Will plan left breast breast lumpectomy with wire localization and focused left axillary sentinel lymph node biopsy under general anesthetic at [facility] on Thursday, October 26

## 2017-10-26 NOTE — BRIEF OP NOTE
Pre-Operative Diagnosis: breast cancer stage 3, left     Post-Operative Diagnosis: breast cancer stage 3, left     Procedure Performed:   Procedure(s):  Left breast lumpectomy with wire localization, left axillary lymph node localization, sentinel lymph

## 2017-10-26 NOTE — ANESTHESIA POSTPROCEDURE EVALUATION
Patient: Jon Prince    Procedure Summary     Date:  10/26/17 Room / Location:  [address] [place] [address] / [address] [place] [address] OR    Anesthesia Start:  2881 Anesthesia Stop:      Procedures:       BREAST LUMPECTOMY (Left Breast)      SENTINEL LYMPH NODE BIOPSY (Left Axilla)

## 2017-10-26 NOTE — IMAGING NOTE
PT TO US DEPT SCOUTS COMPLETED after arrival to ultrasound, which was at 0752  IV TO RIGHT HAND IS INTACT WITH 675CC IN BAG, INFUSING AT TKO.     HX TAKEN PROCEDURE EXPLAINED ORDERED VERFIED AND INITIALED BY ALL STAFF MEMBERS  Hx left axillary mass, left br

## 2017-10-26 NOTE — H&P (VIEW-ONLY)
HPI:    Zahra Gilbert is a 38year old [de-identified]  female. The patient presents to the office for new left breast mass. It has been present since 2 weeks. Previous breast procedures include none.    Bilateral breast mammogram And right breast ultrasound 2/28/20 carcinoma.  -[place] grade 3 (Tubules: 3, Nuclear: 2, Mitoses: 3).    -Longest length of invasive carcinoma 11 mm; 60% of submitted tissue.  -Lymphoid tissue not visualized.        Probe  Result  HER2/CEP17 Ratio    HER2-DEANNA FISH  Equivocal  1.12         a  synchronous or metastatic lesion. Further evaluation with pelvic MRI is recommended if indicated  Clinically.        3/29/2017: Patient presents for follow-up evaluation for port insertion. Patient has no new complaints.   Patient received chemotherapy Patient completed chemotherapy 8/31/2017. Patient for preoperative discussion. Patient presented at multidisciplinary breast tumor board.   Recommendation was for consideration of left breast lumpectomy with focused left axillary sentinel lymph node bio years.   Family history of breast or ovarian cancer: maternal great grandmother with breast cancer.        Allergies:     Hydrocodone             Nausea and vomiting  Seasonal                   Current Meds:     Current Outpatient Prescriptions:  folic acid SURGICAL HISTORY      Comment: Dignity port           Family History   Problem Relation Age of Onset   • Cancer Neg     • Diabetes Neg     • Heart Disorder Neg     • Hypertension Neg     • Lipids Neg         Smoking status: Never Smoker LEFT BREAST: large palpable mass of the left breast at the 7:00 location. No other palpable mass present. Breasts are quite large. RIGHT BREAST: no palpable masses present. Nipple: DISCHARGE: none. RETRACTION: none. SKIN CHANGES: none. 7/28/2017:  Nevin Bustillos postoperatively and this will need to be clarified by radiation therapy as well as plastic surgery. I recommended the patient see Dr. Curly Johns from plastic surgery for further evaluation and discuss reconstruction options as well as the timing.   I d protocol study. We discussed preventive measures as well. I discussed the need for axillary lymph node dissection if frozen section is positive.   The patient did see plastic surgery and recommending delayed reconstruction due to the need for postoperative same surgery with standard node dissection is sentinel lymph nodes positive.  However, Pt understand significant risk of lymphedema, swelling, pain, numbness, deformity of chest wall, increased risk of infection  Attention will be made during the operation with the patient who understands, consents, and wishes to proceed with surgery.  Will plan left breast breast lumpectomy with wire localization and focused left axillary sentinel lymph node biopsy under general anesthetic at [place] on Thursday, October 26

## 2017-10-26 NOTE — INTERVAL H&P NOTE
Pre-op Diagnosis: breast cancer stage 3, left    The above referenced H&P was reviewed by Anthony Cavazos MD on 10/26/2017, the patient was examined and no significant changes have occurred in the patient's condition since the H&P was performed.   I disc

## 2017-10-27 NOTE — OPERATIVE REPORT
[facility]    PATIENT'S NAME: Michelle Arts COREY   ATTENDING PHYSICIAN: Jolie Jasso MD   OPERATING PHYSICIAN: Jolie Jasso MD   PATIENT ACCOUNT#:   [de-identified]    LOCATION:  [address]  MEDICAL RECORD #:   [id number] lymph nodes are positive patient may require further completion, axillary lymph node dissection; or, if margins are positive patient may require reexcision.   I discussed the risks, benefits, complications, alternative treatment options, local recurrence, n excised with LigaSure. The 10-second in vivo reading was 183. The area was completely excised with the LigaSure. The 10-second ex vivo count was 293. The specimen mammography of the axillary sentinel lymph node revealed the clip present.   Intraoperativ marked a true superior, inferior, medial and lateral margins. This was performed to assure adequate margin. Deep margin was also excised in a similar fashion and marked with suture for the deep true margin.   The wound was irrigated with thoroughly with s

## 2017-11-02 PROBLEM — S36.69XA RECTAL TEAR: Status: ACTIVE | Noted: 2017-11-02

## 2017-11-02 PROBLEM — D64.9 ANEMIA, UNSPECIFIED TYPE: Status: ACTIVE | Noted: 2017-11-02

## 2017-11-02 PROBLEM — C50.912 HER2-POSITIVE CARCINOMA OF LEFT BREAST (HCC): Status: ACTIVE | Noted: 2017-11-02

## 2017-11-02 PROBLEM — C50.912 BREAST CANCER, STAGE 3, LEFT (HCC): Status: ACTIVE | Noted: 2017-11-02

## 2017-11-02 PROBLEM — Z98.890 S/P LUMPECTOMY, LEFT BREAST: Status: ACTIVE | Noted: 2017-11-02

## 2017-11-09 PROBLEM — E66.01 MORBID OBESITY DUE TO EXCESS CALORIES (HCC): Status: RESOLVED | Noted: 2017-03-21 | Resolved: 2017-11-09

## 2017-11-09 PROBLEM — N63.0 BREAST MASS IN FEMALE: Status: RESOLVED | Noted: 2017-03-01 | Resolved: 2017-11-09

## 2017-11-22 ENCOUNTER — OFFICE VISIT (OUTPATIENT)
Dept: PHYSICAL THERAPY | Facility: HOSPITAL | Age: 42
End: 2017-11-22
Attending: SURGERY
Payer: COMMERCIAL

## 2017-11-22 PROBLEM — Z17.0 CARCINOMA OF LEFT BREAST, ESTROGEN AND PROGESTERONE RECEPTOR POSITIVE (HCC): Status: ACTIVE | Noted: 2017-11-22

## 2017-11-22 PROBLEM — C50.912 CARCINOMA OF LEFT BREAST, ESTROGEN AND PROGESTERONE RECEPTOR POSITIVE (HCC): Status: ACTIVE | Noted: 2017-11-22

## 2017-11-22 NOTE — PROGRESS NOTES
BREAST CANCER SURGICAL SCREENINGS  [facility]      PATIENT SUMMARY:     Involved Side:           LEFT                                         Dominant Hand:         RIGHT                         Occupation:           Instructional ass Functional IR  t11 t11  Functional IR  t11 t11  Functional IR                      Shoulder strength  Right Left  Shoulder strength  Right Left  Shoulder strength  Right Left    flex 5 5   flex 5 5   flex      abd 5 5   abd 5 5   abd      ext 5 5   ext abduction   STARTING POINT 17CM 17cm from 3rd cuticle   RIGHT HAND VOLUME - 360 ml   LEFT HAND VOLUME - 355 ml   MEASUREMENT A 16.6 17.7   MEASUREMENT B 19.5 20.5   MEASUREMENT C 24.2 24.9   MEASUREMENT D 28 29   MEASUREMENT E 31.2 32.4   MEASUREMENT F 32

## 2017-12-01 PROBLEM — Z17.0 MALIGNANT NEOPLASM OF UPPER-OUTER QUADRANT OF LEFT BREAST IN FEMALE, ESTROGEN RECEPTOR POSITIVE (HCC): Status: ACTIVE | Noted: 2017-12-01

## 2017-12-01 PROBLEM — G63 NEUROPATHY ASSOCIATED WITH CANCER (HCC): Status: ACTIVE | Noted: 2017-12-01

## 2017-12-01 PROBLEM — E66.01 MORBID OBESITY DUE TO EXCESS CALORIES (HCC): Status: ACTIVE | Noted: 2017-12-01

## 2017-12-01 PROBLEM — D50.9 IRON DEFICIENCY ANEMIA, UNSPECIFIED IRON DEFICIENCY ANEMIA TYPE: Status: ACTIVE | Noted: 2017-12-01

## 2017-12-01 PROBLEM — K62.5 RECTAL BLEEDING: Status: ACTIVE | Noted: 2017-12-01

## 2017-12-01 PROBLEM — C80.1 NEUROPATHY ASSOCIATED WITH CANCER (HCC): Status: ACTIVE | Noted: 2017-12-01

## 2017-12-01 PROBLEM — C50.412 MALIGNANT NEOPLASM OF UPPER-OUTER QUADRANT OF LEFT BREAST IN FEMALE, ESTROGEN RECEPTOR POSITIVE (HCC): Status: ACTIVE | Noted: 2017-12-01

## 2017-12-01 PROBLEM — D64.9 ANEMIA, UNSPECIFIED TYPE: Status: ACTIVE | Noted: 2017-12-01

## 2017-12-22 PROBLEM — C50.912 BREAST CANCER, STAGE 3, LEFT (HCC): Status: ACTIVE | Noted: 2017-12-22

## 2017-12-22 PROBLEM — C50.412 MALIGNANT NEOPLASM OF UPPER-OUTER QUADRANT OF LEFT BREAST IN FEMALE, ESTROGEN RECEPTOR POSITIVE (HCC): Status: ACTIVE | Noted: 2017-12-22

## 2017-12-22 PROBLEM — D64.9 ANEMIA, UNSPECIFIED TYPE: Status: ACTIVE | Noted: 2017-12-22

## 2017-12-22 PROBLEM — S36.69XA RECTAL TEAR: Status: ACTIVE | Noted: 2017-12-22

## 2017-12-22 PROBLEM — Z17.0 MALIGNANT NEOPLASM OF UPPER-OUTER QUADRANT OF LEFT BREAST IN FEMALE, ESTROGEN RECEPTOR POSITIVE (HCC): Status: ACTIVE | Noted: 2017-12-22

## 2017-12-22 PROBLEM — C80.1 NEUROPATHY ASSOCIATED WITH CANCER (HCC): Status: ACTIVE | Noted: 2017-12-22

## 2017-12-22 PROBLEM — G63 NEUROPATHY ASSOCIATED WITH CANCER (HCC): Status: ACTIVE | Noted: 2017-12-22

## 2017-12-27 PROCEDURE — 83001 ASSAY OF GONADOTROPIN (FSH): CPT | Performed by: OBSTETRICS & GYNECOLOGY

## 2017-12-27 PROCEDURE — 84146 ASSAY OF PROLACTIN: CPT | Performed by: OBSTETRICS & GYNECOLOGY

## 2017-12-27 PROCEDURE — 83520 IMMUNOASSAY QUANT NOS NONAB: CPT | Performed by: OBSTETRICS & GYNECOLOGY

## 2017-12-27 PROCEDURE — 82670 ASSAY OF TOTAL ESTRADIOL: CPT | Performed by: OBSTETRICS & GYNECOLOGY

## 2017-12-27 PROCEDURE — 83002 ASSAY OF GONADOTROPIN (LH): CPT | Performed by: OBSTETRICS & GYNECOLOGY

## 2017-12-30 ENCOUNTER — HOSPITAL (OUTPATIENT)
Dept: OTHER | Age: 42
End: 2017-12-30
Attending: INTERNAL MEDICINE

## 2017-12-30 PROBLEM — D64.9 ANEMIA, UNSPECIFIED TYPE: Status: RESOLVED | Noted: 2017-10-02 | Resolved: 2017-12-30

## 2017-12-30 PROBLEM — D64.9 ANEMIA, UNSPECIFIED TYPE: Status: RESOLVED | Noted: 2017-12-01 | Resolved: 2017-12-30

## 2017-12-30 PROBLEM — E66.01 MORBID OBESITY DUE TO EXCESS CALORIES (HCC): Status: RESOLVED | Noted: 2017-12-01 | Resolved: 2017-12-30

## 2017-12-30 PROBLEM — R22.32 AXILLARY MASS, LEFT: Status: RESOLVED | Noted: 2017-06-08 | Resolved: 2017-12-30

## 2017-12-30 PROBLEM — D50.9 IRON DEFICIENCY ANEMIA, UNSPECIFIED IRON DEFICIENCY ANEMIA TYPE: Status: RESOLVED | Noted: 2017-12-01 | Resolved: 2017-12-30

## 2017-12-30 PROBLEM — D25.9 UTERINE LEIOMYOMA, UNSPECIFIED LOCATION: Status: RESOLVED | Noted: 2017-03-21 | Resolved: 2017-12-30

## 2017-12-30 PROBLEM — C50.912 BREAST CANCER, STAGE 3, LEFT (HCC): Status: RESOLVED | Noted: 2017-11-02 | Resolved: 2017-12-30

## 2017-12-30 PROBLEM — G63 NEUROPATHY ASSOCIATED WITH CANCER (HCC): Status: RESOLVED | Noted: 2017-12-22 | Resolved: 2017-12-30

## 2017-12-30 PROBLEM — D64.9 ANEMIA, UNSPECIFIED TYPE: Status: RESOLVED | Noted: 2017-11-02 | Resolved: 2017-12-30

## 2017-12-30 PROBLEM — R22.32 AXILLARY MASS, LEFT: Status: RESOLVED | Noted: 2017-03-21 | Resolved: 2017-12-30

## 2017-12-30 PROBLEM — D64.9 ANEMIA, UNSPECIFIED TYPE: Status: RESOLVED | Noted: 2017-12-22 | Resolved: 2017-12-30

## 2017-12-30 PROBLEM — C80.1 NEUROPATHY ASSOCIATED WITH CANCER (HCC): Status: RESOLVED | Noted: 2017-12-22 | Resolved: 2017-12-30

## 2017-12-30 PROBLEM — C50.912 BREAST CANCER, STAGE 3, LEFT (HCC): Status: RESOLVED | Noted: 2017-06-08 | Resolved: 2017-12-30

## 2017-12-30 PROBLEM — S36.69XA RECTAL TEAR: Status: RESOLVED | Noted: 2017-10-02 | Resolved: 2017-12-30

## 2017-12-30 PROBLEM — K62.5 RECTAL BLEEDING: Status: RESOLVED | Noted: 2017-12-01 | Resolved: 2017-12-30

## 2017-12-30 PROBLEM — C50.912 BREAST CANCER, STAGE 3, LEFT (HCC): Status: RESOLVED | Noted: 2017-12-22 | Resolved: 2017-12-30

## 2017-12-30 PROBLEM — S31.831A ANAL TEAR: Status: RESOLVED | Noted: 2017-08-31 | Resolved: 2017-12-30

## 2017-12-30 PROBLEM — N63.0 BREAST MASS IN FEMALE: Status: RESOLVED | Noted: 2017-10-02 | Resolved: 2017-12-30

## 2017-12-30 PROBLEM — C50.412 MALIGNANT NEOPLASM OF UPPER-OUTER QUADRANT OF LEFT BREAST IN FEMALE, ESTROGEN RECEPTOR POSITIVE (HCC): Status: RESOLVED | Noted: 2017-12-22 | Resolved: 2017-12-30

## 2017-12-30 PROBLEM — E66.01 MORBID OBESITY DUE TO EXCESS CALORIES (HCC): Status: RESOLVED | Noted: 2017-03-21 | Resolved: 2017-12-30

## 2017-12-30 PROBLEM — C50.912 MALIGNANT NEOPLASM OF LEFT FEMALE BREAST (HCC): Status: RESOLVED | Noted: 2017-06-08 | Resolved: 2017-12-30

## 2017-12-30 PROBLEM — C50.912 HER2-POSITIVE CARCINOMA OF LEFT BREAST (HCC): Status: RESOLVED | Noted: 2017-11-02 | Resolved: 2017-12-30

## 2017-12-30 PROBLEM — C50.912 BREAST CANCER, STAGE 3, LEFT (HCC): Status: RESOLVED | Noted: 2017-03-21 | Resolved: 2017-12-30

## 2017-12-30 PROBLEM — S36.69XA RECTAL TEAR: Status: RESOLVED | Noted: 2017-11-02 | Resolved: 2017-12-30

## 2017-12-30 PROBLEM — T45.1X5A ANEMIA DUE TO ANTINEOPLASTIC CHEMOTHERAPY: Status: RESOLVED | Noted: 2017-06-08 | Resolved: 2017-12-30

## 2017-12-30 PROBLEM — C50.912 BREAST CANCER, STAGE 3, LEFT (HCC): Status: RESOLVED | Noted: 2017-10-02 | Resolved: 2017-12-30

## 2017-12-30 PROBLEM — D64.81 ANEMIA DUE TO ANTINEOPLASTIC CHEMOTHERAPY: Status: RESOLVED | Noted: 2017-06-08 | Resolved: 2017-12-30

## 2017-12-30 PROBLEM — Z17.0 MALIGNANT NEOPLASM OF UPPER-OUTER QUADRANT OF LEFT BREAST IN FEMALE, ESTROGEN RECEPTOR POSITIVE (HCC): Status: RESOLVED | Noted: 2017-12-22 | Resolved: 2017-12-30

## 2017-12-30 LAB
ANALYZER ANC (IANC): ABNORMAL
ANION GAP SERPL CALC-SCNC: 13 MMOL/L (ref 10–20)
BASOPHILS # BLD: 0 THOUSAND/MCL (ref 0–0.3)
BASOPHILS NFR BLD: 0 %
BUN SERPL-MCNC: 18 MG/DL (ref 6–20)
BUN/CREAT SERPL: 25 (ref 7–25)
CALCIUM SERPL-MCNC: 9.9 MG/DL (ref 8.4–10.2)
CHLORIDE: 104 MMOL/L (ref 98–107)
CO2 SERPL-SCNC: 30 MMOL/L (ref 21–32)
CREAT SERPL-MCNC: 0.72 MG/DL (ref 0.51–0.95)
DIFFERENTIAL METHOD BLD: ABNORMAL
EOSINOPHIL # BLD: 0 THOUSAND/MCL (ref 0.1–0.5)
EOSINOPHIL NFR BLD: 0 %
ERYTHROCYTE [DISTWIDTH] IN BLOOD: 13.8 % (ref 11–15)
GLUCOSE SERPL-MCNC: 104 MG/DL (ref 65–99)
HEMATOCRIT: 34 % (ref 36–46.5)
HGB BLD-MCNC: 10.8 GM/DL (ref 12–15.5)
INR PPP: 1
LYMPHOCYTES # BLD: 1.4 THOUSAND/MCL (ref 1–4.8)
LYMPHOCYTES NFR BLD: 12 %
MCH RBC QN AUTO: 26.2 PG (ref 26–34)
MCHC RBC AUTO-ENTMCNC: 31.8 GM/DL (ref 32–36.5)
MCV RBC AUTO: 82.5 FL (ref 78–100)
MONOCYTES # BLD: 0.5 THOUSAND/MCL (ref 0.3–0.9)
MONOCYTES NFR BLD: 4 %
NEUTROPHILS # BLD: 10.4 THOUSAND/MCL (ref 1.8–7.7)
NEUTROPHILS NFR BLD: 84 %
NEUTS SEG NFR BLD: ABNORMAL %
PERCENT NRBC: ABNORMAL
PLATELET # BLD: 335 THOUSAND/MCL (ref 140–450)
POTASSIUM SERPL-SCNC: 4.3 MMOL/L (ref 3.4–5.1)
PROTHROMBIN TIME: 11 SECONDS (ref 9.7–11.8)
PROTHROMBIN TIME: NORMAL
RBC # BLD: 4.12 MILLION/MCL (ref 4–5.2)
SODIUM SERPL-SCNC: 143 MMOL/L (ref 135–145)
WBC # BLD: 12.3 THOUSAND/MCL (ref 4.2–11)

## 2017-12-31 LAB
ALBUMIN SERPL-MCNC: 2.6 GM/DL (ref 3.6–5.1)
ALBUMIN/GLOB SERPL: 0.7 {RATIO} (ref 1–2.4)
ALP SERPL-CCNC: 74 UNIT/L (ref 45–117)
ALT SERPL-CCNC: 28 UNIT/L
ANALYZER ANC (IANC): ABNORMAL
ANION GAP SERPL CALC-SCNC: 13 MMOL/L (ref 10–20)
AST SERPL-CCNC: 17 UNIT/L
B-HCG UR QL: NEGATIVE
BASOPHILS # BLD: 0 THOUSAND/MCL (ref 0–0.3)
BASOPHILS NFR BLD: 0 %
BILIRUB SERPL-MCNC: 0.4 MG/DL (ref 0.2–1)
BUN SERPL-MCNC: 17 MG/DL (ref 6–20)
BUN/CREAT SERPL: 25 (ref 7–25)
CALCIUM SERPL-MCNC: 9.2 MG/DL (ref 8.4–10.2)
CHLORIDE: 107 MMOL/L (ref 98–107)
CO2 SERPL-SCNC: 28 MMOL/L (ref 21–32)
CREAT SERPL-MCNC: 0.69 MG/DL (ref 0.51–0.95)
DIFFERENTIAL METHOD BLD: ABNORMAL
EOSINOPHIL # BLD: 0.1 THOUSAND/MCL (ref 0.1–0.5)
EOSINOPHIL NFR BLD: 1 %
ERYTHROCYTE [DISTWIDTH] IN BLOOD: 13.8 % (ref 11–15)
GLOBULIN SER-MCNC: 3.5 GM/DL (ref 2–4)
GLUCOSE SERPL-MCNC: 102 MG/DL (ref 65–99)
HEMATOCRIT: 32.3 % (ref 36–46.5)
HGB BLD-MCNC: 10.1 GM/DL (ref 12–15.5)
HYPOCHROMIA (HYPOC): ABNORMAL
LYMPHOCYTES # BLD: 1.3 THOUSAND/MCL (ref 1–4.8)
LYMPHOCYTES NFR BLD: 13 %
MCH RBC QN AUTO: 25.8 PG (ref 26–34)
MCHC RBC AUTO-ENTMCNC: 31.3 GM/DL (ref 32–36.5)
MCV RBC AUTO: 82.6 FL (ref 78–100)
METAMYELOCYTES NFR BLD: 2 % (ref 0–2)
MONOCYTES # BLD: 1.3 THOUSAND/MCL (ref 0.3–0.9)
MONOCYTES NFR BLD: 13 %
NEUTROPHILS # BLD: 7.1 THOUSAND/MCL (ref 1.8–7.7)
NEUTS BAND NFR BLD: 3 % (ref 0–10)
NEUTS SEG NFR BLD: 68 %
PATH REV BLD -IMP: ABNORMAL
PLAT MORPH BLD: NORMAL
PLATELET # BLD: 287 THOUSAND/MCL (ref 140–450)
POTASSIUM SERPL-SCNC: 3.8 MMOL/L (ref 3.4–5.1)
PROT SERPL-MCNC: 6.1 GM/DL (ref 6.4–8.2)
RBC # BLD: 3.91 MILLION/MCL (ref 4–5.2)
SODIUM SERPL-SCNC: 144 MMOL/L (ref 135–145)
VANCOMYCIN TROUGH SERPL-MCNC: 14.6 MCG/ML (ref 10–20)
WBC # BLD: 10 THOUSAND/MCL (ref 4.2–11)
WBC MORPH BLD: NORMAL

## 2018-01-01 LAB
ANALYZER ANC (IANC): ABNORMAL
BASOPHILS # BLD: 0 THOUSAND/MCL (ref 0–0.3)
BASOPHILS NFR BLD: 0 %
CREAT SERPL-MCNC: 0.74 MG/DL (ref 0.51–0.95)
DIFFERENTIAL METHOD BLD: ABNORMAL
EOSINOPHIL # BLD: 0 THOUSAND/MCL (ref 0.1–0.5)
EOSINOPHIL NFR BLD: 0 %
ERYTHROCYTE [DISTWIDTH] IN BLOOD: 13.8 % (ref 11–15)
HEMATOCRIT: 34.2 % (ref 36–46.5)
HGB BLD-MCNC: 10.7 GM/DL (ref 12–15.5)
LYMPHOCYTES # BLD: 1.9 THOUSAND/MCL (ref 1–4.8)
LYMPHOCYTES NFR BLD: 18 %
MAGNESIUM SERPL-MCNC: 1.8 MG/DL (ref 1.7–2.4)
MCH RBC QN AUTO: 25.8 PG (ref 26–34)
MCHC RBC AUTO-ENTMCNC: 31.3 GM/DL (ref 32–36.5)
MCV RBC AUTO: 82.4 FL (ref 78–100)
MONOCYTES # BLD: 0.3 THOUSAND/MCL (ref 0.3–0.9)
MONOCYTES NFR BLD: 3 %
NEUTROPHILS # BLD: 8.4 THOUSAND/MCL (ref 1.8–7.7)
NEUTS SEG NFR BLD: 79 %
OVALOCYTES (OVALO): ABNORMAL
PATH REV BLD -IMP: ABNORMAL
PLAT MORPH BLD: NORMAL
PLATELET # BLD: 306 THOUSAND/MCL (ref 140–450)
POTASSIUM SERPL-SCNC: 4.1 MMOL/L (ref 3.4–5.1)
RBC # BLD: 4.15 MILLION/MCL (ref 4–5.2)
TOXIC GRANULATION (TOXIC): PRESENT
TOXIC VACUOLATION (TOXV): PRESENT
WBC # BLD: 10.6 THOUSAND/MCL (ref 4.2–11)

## 2018-01-02 LAB
ANALYZER ANC (IANC): ABNORMAL
ANALYZER ANC (IANC): ABNORMAL
ANION GAP SERPL CALC-SCNC: 9 MMOL/L (ref 10–20)
BASOPHILS # BLD: 0 THOUSAND/MCL (ref 0–0.3)
BASOPHILS NFR BLD: 0 %
BUN SERPL-MCNC: 11 MG/DL (ref 6–20)
BUN/CREAT SERPL: 14 (ref 7–25)
CALCIUM SERPL-MCNC: 9.5 MG/DL (ref 8.4–10.2)
CHLORIDE: 106 MMOL/L (ref 98–107)
CO2 SERPL-SCNC: 31 MMOL/L (ref 21–32)
CREAT SERPL-MCNC: 0.78 MG/DL (ref 0.51–0.95)
DIFFERENTIAL METHOD BLD: ABNORMAL
EOSINOPHIL # BLD: 0.2 THOUSAND/MCL (ref 0.1–0.5)
EOSINOPHIL NFR BLD: 2 %
ERYTHROCYTE [DISTWIDTH] IN BLOOD: 14 % (ref 11–15)
ERYTHROCYTE [DISTWIDTH] IN BLOOD: 14.1 % (ref 11–15)
GLUCOSE SERPL-MCNC: 95 MG/DL (ref 65–99)
HEMATOCRIT: 32.4 % (ref 36–46.5)
HEMATOCRIT: 32.6 % (ref 36–46.5)
HGB BLD-MCNC: 10.2 GM/DL (ref 12–15.5)
HGB BLD-MCNC: 10.2 GM/DL (ref 12–15.5)
LYMPHOCYTES # BLD: 1.1 THOUSAND/MCL (ref 1–4.8)
LYMPHOCYTES NFR BLD: 12 %
MCH RBC QN AUTO: 26.2 PG (ref 26–34)
MCH RBC QN AUTO: 26.4 PG (ref 26–34)
MCHC RBC AUTO-ENTMCNC: 31.3 GM/DL (ref 32–36.5)
MCHC RBC AUTO-ENTMCNC: 31.5 GM/DL (ref 32–36.5)
MCV RBC AUTO: 83.8 FL (ref 78–100)
MCV RBC AUTO: 83.9 FL (ref 78–100)
MONOCYTES # BLD: 0.7 THOUSAND/MCL (ref 0.3–0.9)
MONOCYTES NFR BLD: 8 %
NEUTROPHILS # BLD: 7.3 THOUSAND/MCL (ref 1.8–7.7)
NEUTROPHILS NFR BLD: 78 %
NEUTS SEG NFR BLD: ABNORMAL %
PERCENT NRBC: ABNORMAL
PLATELET # BLD: 335 THOUSAND/MCL (ref 140–450)
PLATELET # BLD: 337 THOUSAND/MCL (ref 140–450)
POTASSIUM SERPL-SCNC: 3.9 MMOL/L (ref 3.4–5.1)
RBC # BLD: 3.86 MILLION/MCL (ref 4–5.2)
RBC # BLD: 3.89 MILLION/MCL (ref 4–5.2)
SODIUM SERPL-SCNC: 142 MMOL/L (ref 135–145)
WBC # BLD: 10 THOUSAND/MCL (ref 4.2–11)
WBC # BLD: 9.3 THOUSAND/MCL (ref 4.2–11)

## 2018-01-23 ENCOUNTER — OFFICE VISIT (OUTPATIENT)
Dept: PHYSICAL THERAPY | Facility: HOSPITAL | Age: 43
End: 2018-01-23
Attending: SURGERY
Payer: COMMERCIAL

## 2018-01-23 NOTE — PROGRESS NOTES
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                     Pre-surgical screening date: 8/9/17    Post-surgical screening date: 11/22/17    Post-surgical screening date: 1/23/18     Pain scale:  0/10    Pain scale:  \"some tig Cording (grade 0-3):     Cording (grade 0-3):      R: 0    R: 0    R: 0     L: 0    L: ?    L: 0     Miscellaneous:      Miscellaneous:  LUE tightness decreased with instruction on stretches.       Miscellaneous:  *L breast infection with surgical debrid MEASUREMENT I 43.5 45   MEASUREMENT J 48.9 49.3   MEASUREMENT K 48.9 48.8    TOTAL VOLUME  [phone] [phone]   % DIFFERENCE 7.13314 -[phone]

## 2018-05-23 PROBLEM — N63.10 BREAST MASS, RIGHT: Status: ACTIVE | Noted: 2017-10-02

## 2018-05-30 PROBLEM — R94.31 ABNORMAL EKG: Status: ACTIVE | Noted: 2018-05-30

## 2018-05-30 PROBLEM — Z01.818 PREOPERATIVE EVALUATION TO RULE OUT SURGICAL CONTRAINDICATION: Status: ACTIVE | Noted: 2018-05-30

## 2018-05-30 PROCEDURE — 81003 URINALYSIS AUTO W/O SCOPE: CPT | Performed by: INTERNAL MEDICINE

## 2018-05-30 PROCEDURE — 87081 CULTURE SCREEN ONLY: CPT | Performed by: INTERNAL MEDICINE

## 2018-06-05 ENCOUNTER — APPOINTMENT (OUTPATIENT)
Dept: LAB | Age: 43
End: 2018-06-05
Attending: SURGERY
Payer: COMMERCIAL

## 2018-06-05 PROCEDURE — 88307 TISSUE EXAM BY PATHOLOGIST: CPT | Performed by: SURGERY

## 2018-06-13 PROBLEM — Z91.89 AT HIGH RISK FOR BREAST CANCER: Status: ACTIVE | Noted: 2018-06-13

## 2018-06-13 PROBLEM — R92.2 BREAST DENSITY: Status: ACTIVE | Noted: 2018-06-13

## 2018-07-22 PROBLEM — S36.69XA RECTAL TEAR: Status: RESOLVED | Noted: 2017-12-22 | Resolved: 2018-07-22

## 2018-10-03 PROCEDURE — 88305 TISSUE EXAM BY PATHOLOGIST: CPT | Performed by: INTERNAL MEDICINE

## 2018-10-05 PROBLEM — K63.5 HYPERPLASTIC POLYP OF LARGE INTESTINE: Status: ACTIVE | Noted: 2018-10-05

## 2019-05-30 ENCOUNTER — HOSPITAL (OUTPATIENT)
Dept: OTHER | Age: 44
End: 2019-05-30

## 2019-05-31 LAB
ABO + RH BLD: NORMAL
ABO + RH BLD: NORMAL
BLD GP AB SCN SERPL QL: NEGATIVE
CROSSMATCH EXPIRE: NORMAL
HCT VFR BLD CALC: 39.5 % (ref 36–46.5)
HGB BLD-MCNC: 12.1 G/DL (ref 12–15.5)

## 2019-06-02 LAB — PATH REPORT, CYTOLOGY: NORMAL

## 2019-06-03 LAB — PATHOLOGIST NAME: NORMAL

## 2019-06-27 ENCOUNTER — ANESTHESIA (OUTPATIENT)
Dept: SURGERY | Facility: HOSPITAL | Age: 44
End: 2019-06-27
Payer: COMMERCIAL

## 2019-06-27 ENCOUNTER — HOSPITAL ENCOUNTER (OUTPATIENT)
Facility: HOSPITAL | Age: 44
Setting detail: HOSPITAL OUTPATIENT SURGERY
Discharge: HOME | End: 2019-06-27
Attending: SURGERY | Admitting: SURGERY
Payer: COMMERCIAL

## 2019-06-27 ENCOUNTER — ANESTHESIA EVENT (OUTPATIENT)
Dept: SURGERY | Facility: HOSPITAL | Age: 44
End: 2019-06-27
Payer: COMMERCIAL

## 2019-06-27 VITALS
WEIGHT: 293 LBS | SYSTOLIC BLOOD PRESSURE: 112 MMHG | DIASTOLIC BLOOD PRESSURE: 61 MMHG | OXYGEN SATURATION: 100 % | RESPIRATION RATE: 20 BRPM | TEMPERATURE: 98 F | HEIGHT: 67 IN | BODY MASS INDEX: 45.99 KG/M2 | HEART RATE: 75 BPM

## 2019-06-27 DIAGNOSIS — C50.912 MALIGNANT NEOPLASM OF LEFT BREAST, STAGE 3 (HCC): Primary | ICD-10-CM

## 2019-06-27 PROCEDURE — 0JPT0WZ REMOVAL OF TOTALLY IMPLANTABLE VASCULAR ACCESS DEVICE FROM TRUNK SUBCUTANEOUS TISSUE AND FASCIA, OPEN APPROACH: ICD-10-PCS | Performed by: SURGERY

## 2019-06-27 RX ORDER — KETOROLAC TROMETHAMINE 15 MG/ML
15 INJECTION, SOLUTION INTRAMUSCULAR; INTRAVENOUS EVERY 6 HOURS PRN
Status: DISCONTINUED | OUTPATIENT
Start: 2019-06-27 | End: 2019-06-27

## 2019-06-27 RX ORDER — ACETAMINOPHEN 500 MG
1000 TABLET ORAL ONCE
Status: COMPLETED | OUTPATIENT
Start: 2019-06-27 | End: 2019-06-27

## 2019-06-27 RX ORDER — SODIUM CHLORIDE, SODIUM LACTATE, POTASSIUM CHLORIDE, CALCIUM CHLORIDE 600; 310; 30; 20 MG/100ML; MG/100ML; MG/100ML; MG/100ML
INJECTION, SOLUTION INTRAVENOUS CONTINUOUS
Status: DISCONTINUED | OUTPATIENT
Start: 2019-06-27 | End: 2019-06-27

## 2019-06-27 RX ORDER — ACETAMINOPHEN 325 MG/1
650 TABLET ORAL EVERY 4 HOURS PRN
Status: DISCONTINUED | OUTPATIENT
Start: 2019-06-27 | End: 2019-06-27

## 2019-06-27 RX ORDER — MORPHINE SULFATE 4 MG/ML
4 INJECTION, SOLUTION INTRAMUSCULAR; INTRAVENOUS EVERY 2 HOUR PRN
Status: DISCONTINUED | OUTPATIENT
Start: 2019-06-27 | End: 2019-06-27

## 2019-06-27 RX ORDER — HYDROCODONE BITARTRATE AND ACETAMINOPHEN 5; 325 MG/1; MG/1
1 TABLET ORAL AS NEEDED
Status: DISCONTINUED | OUTPATIENT
Start: 2019-06-27 | End: 2019-06-27

## 2019-06-27 RX ORDER — HYDROCODONE BITARTRATE AND ACETAMINOPHEN 7.5; 325 MG/1; MG/1
1 TABLET ORAL EVERY 4 HOURS PRN
Status: DISCONTINUED | OUTPATIENT
Start: 2019-06-27 | End: 2019-06-27

## 2019-06-27 RX ORDER — MORPHINE SULFATE 4 MG/ML
6 INJECTION, SOLUTION INTRAMUSCULAR; INTRAVENOUS EVERY 2 HOUR PRN
Status: DISCONTINUED | OUTPATIENT
Start: 2019-06-27 | End: 2019-06-27

## 2019-06-27 RX ORDER — FAMOTIDINE 20 MG/1
20 TABLET ORAL ONCE
Status: COMPLETED | OUTPATIENT
Start: 2019-06-27 | End: 2019-06-27

## 2019-06-27 RX ORDER — NALOXONE HYDROCHLORIDE 0.4 MG/ML
80 INJECTION, SOLUTION INTRAMUSCULAR; INTRAVENOUS; SUBCUTANEOUS AS NEEDED
Status: DISCONTINUED | OUTPATIENT
Start: 2019-06-27 | End: 2019-06-27

## 2019-06-27 RX ORDER — ONDANSETRON 2 MG/ML
4 INJECTION INTRAMUSCULAR; INTRAVENOUS ONCE AS NEEDED
Status: DISCONTINUED | OUTPATIENT
Start: 2019-06-27 | End: 2019-06-27

## 2019-06-27 RX ORDER — HYDROMORPHONE HYDROCHLORIDE 1 MG/ML
0.2 INJECTION, SOLUTION INTRAMUSCULAR; INTRAVENOUS; SUBCUTANEOUS EVERY 5 MIN PRN
Status: DISCONTINUED | OUTPATIENT
Start: 2019-06-27 | End: 2019-06-27

## 2019-06-27 RX ORDER — METOCLOPRAMIDE HYDROCHLORIDE 5 MG/ML
10 INJECTION INTRAMUSCULAR; INTRAVENOUS EVERY 8 HOURS PRN
Status: DISCONTINUED | OUTPATIENT
Start: 2019-06-27 | End: 2019-06-27

## 2019-06-27 RX ORDER — MORPHINE SULFATE 4 MG/ML
2 INJECTION, SOLUTION INTRAMUSCULAR; INTRAVENOUS EVERY 10 MIN PRN
Status: DISCONTINUED | OUTPATIENT
Start: 2019-06-27 | End: 2019-06-27

## 2019-06-27 RX ORDER — HYDROCODONE BITARTRATE AND ACETAMINOPHEN 7.5; 325 MG/1; MG/1
2 TABLET ORAL EVERY 4 HOURS PRN
Status: DISCONTINUED | OUTPATIENT
Start: 2019-06-27 | End: 2019-06-27

## 2019-06-27 RX ORDER — HYDROCODONE BITARTRATE AND ACETAMINOPHEN 5; 325 MG/1; MG/1
2 TABLET ORAL AS NEEDED
Status: DISCONTINUED | OUTPATIENT
Start: 2019-06-27 | End: 2019-06-27

## 2019-06-27 RX ORDER — ONDANSETRON 2 MG/ML
4 INJECTION INTRAMUSCULAR; INTRAVENOUS EVERY 6 HOURS PRN
Status: DISCONTINUED | OUTPATIENT
Start: 2019-06-27 | End: 2019-06-27

## 2019-06-27 RX ORDER — KETOROLAC TROMETHAMINE 30 MG/ML
30 INJECTION, SOLUTION INTRAMUSCULAR; INTRAVENOUS EVERY 6 HOURS PRN
Status: DISCONTINUED | OUTPATIENT
Start: 2019-06-27 | End: 2019-06-27

## 2019-06-27 RX ORDER — MIDAZOLAM HYDROCHLORIDE 1 MG/ML
INJECTION INTRAMUSCULAR; INTRAVENOUS AS NEEDED
Status: DISCONTINUED | OUTPATIENT
Start: 2019-06-27 | End: 2019-06-27 | Stop reason: SURG

## 2019-06-27 RX ORDER — MORPHINE SULFATE 2 MG/ML
2 INJECTION, SOLUTION INTRAMUSCULAR; INTRAVENOUS EVERY 2 HOUR PRN
Status: DISCONTINUED | OUTPATIENT
Start: 2019-06-27 | End: 2019-06-27

## 2019-06-27 RX ORDER — HYDROMORPHONE HYDROCHLORIDE 1 MG/ML
0.4 INJECTION, SOLUTION INTRAMUSCULAR; INTRAVENOUS; SUBCUTANEOUS EVERY 5 MIN PRN
Status: DISCONTINUED | OUTPATIENT
Start: 2019-06-27 | End: 2019-06-27

## 2019-06-27 RX ORDER — HYDROMORPHONE HYDROCHLORIDE 1 MG/ML
0.6 INJECTION, SOLUTION INTRAMUSCULAR; INTRAVENOUS; SUBCUTANEOUS EVERY 5 MIN PRN
Status: DISCONTINUED | OUTPATIENT
Start: 2019-06-27 | End: 2019-06-27

## 2019-06-27 RX ORDER — PROCHLORPERAZINE EDISYLATE 5 MG/ML
5 INJECTION INTRAMUSCULAR; INTRAVENOUS ONCE AS NEEDED
Status: DISCONTINUED | OUTPATIENT
Start: 2019-06-27 | End: 2019-06-27

## 2019-06-27 RX ORDER — MORPHINE SULFATE 4 MG/ML
4 INJECTION, SOLUTION INTRAMUSCULAR; INTRAVENOUS EVERY 10 MIN PRN
Status: DISCONTINUED | OUTPATIENT
Start: 2019-06-27 | End: 2019-06-27

## 2019-06-27 RX ORDER — LIDOCAINE HYDROCHLORIDE 20 MG/ML
INJECTION, SOLUTION EPIDURAL; INFILTRATION; INTRACAUDAL; PERINEURAL AS NEEDED
Status: DISCONTINUED | OUTPATIENT
Start: 2019-06-27 | End: 2019-06-27 | Stop reason: SURG

## 2019-06-27 RX ORDER — LIDOCAINE HYDROCHLORIDE 10 MG/ML
INJECTION, SOLUTION EPIDURAL; INFILTRATION; INTRACAUDAL; PERINEURAL AS NEEDED
Status: DISCONTINUED | OUTPATIENT
Start: 2019-06-27 | End: 2019-06-27 | Stop reason: HOSPADM

## 2019-06-27 RX ORDER — GLYCOPYRROLATE 0.2 MG/ML
INJECTION INTRAMUSCULAR; INTRAVENOUS AS NEEDED
Status: DISCONTINUED | OUTPATIENT
Start: 2019-06-27 | End: 2019-06-27 | Stop reason: SURG

## 2019-06-27 RX ORDER — MORPHINE SULFATE 10 MG/ML
6 INJECTION, SOLUTION INTRAMUSCULAR; INTRAVENOUS EVERY 10 MIN PRN
Status: DISCONTINUED | OUTPATIENT
Start: 2019-06-27 | End: 2019-06-27

## 2019-06-27 RX ORDER — METOCLOPRAMIDE 10 MG/1
10 TABLET ORAL ONCE
Status: COMPLETED | OUTPATIENT
Start: 2019-06-27 | End: 2019-06-27

## 2019-06-27 RX ORDER — HALOPERIDOL 5 MG/ML
0.25 INJECTION INTRAMUSCULAR ONCE AS NEEDED
Status: DISCONTINUED | OUTPATIENT
Start: 2019-06-27 | End: 2019-06-27

## 2019-06-27 RX ADMIN — LIDOCAINE HYDROCHLORIDE 20 MG: 20 INJECTION, SOLUTION EPIDURAL; INFILTRATION; INTRACAUDAL; PERINEURAL at 07:50:00

## 2019-06-27 RX ADMIN — GLYCOPYRROLATE 0.2 MG: 0.2 INJECTION INTRAMUSCULAR; INTRAVENOUS at 07:50:00

## 2019-06-27 RX ADMIN — SODIUM CHLORIDE, SODIUM LACTATE, POTASSIUM CHLORIDE, CALCIUM CHLORIDE: 600; 310; 30; 20 INJECTION, SOLUTION INTRAVENOUS at 08:37:00

## 2019-06-27 RX ADMIN — MIDAZOLAM HYDROCHLORIDE 2 MG: 1 INJECTION INTRAMUSCULAR; INTRAVENOUS at 07:45:00

## 2019-06-27 NOTE — INTERVAL H&P NOTE
Pre-op Diagnosis: breast cancer stage 3    The above referenced H&P was reviewed by Gabino Agarwal MD on 6/27/2019, the patient was examined and no significant changes have occurred in the patient's condition since the H&P was performed.   I discussed w

## 2019-06-27 NOTE — H&P (VIEW-ONLY)
Patient presents with: Follow - Up: Follow up breast/ discuss poss port removal      HPI:    Adelina Gomez is a 45year old Black  female. The patient presents to the office for new left breast mass. It has been present since 2 weeks.  Previous breast pro breast, axillary lymph node, ultrasound-guided biopsy:  -Invasive ductal carcinoma.  -[place] grade 3 (Tubules: 3, Nuclear: 2, Mitoses: 3).    -Longest length of invasive carcinoma 11 mm; 60% of submitted tissue.  -Lymphoid tissue not visualized.       uptake in the region of the right adnexa. This is suspicious for either a  synchronous or metastatic lesion.  Further evaluation with pelvic MRI is recommended if indicated  Clinically.        3/29/2017: Patient presents for follow-up evaluation for port in axillary metastasis. Patient is receiving chemotherapy as well as Herceptin   Patient completed chemotherapy 8/31/2017. Patient for preoperative discussion. Patient presented at multidisciplinary breast tumor board.   Recommendation was for consideration with focused sentinel lymph node biopsy on 10/26/2017. Patient without complaints.     OPERATION DATE:  10/26/2017     OPERATIVE REPORT     PREOPERATIVE DIAGNOSIS:  Preoperative stage III, left breast cancer.   POSTOPERATIVE DIAGNOSIS:  Preoperative stage equivocal HER-2/kaiden FISH analysis.  The left axillary lymph node metastasis showed strong ER positivity (90%), strong focal HI positivity (15%), unfavorable Ki-67 (45%) and equivocal HER-2/kaiden FISH analysis.  The patient is status post preoperative neoadjuv fibrosis.   · No evidence of atypical epithelial hyperplasia, carcinoma in situ, infiltrating carcinoma, or other malignant neoplastic infiltrates identified.     E.  Left breast, lateral margin; wider excision:  · Portion of breast tissue demonstrating sca 10/27/17 at 6:48 p.m.      Electronically signed by Nathalie Albrecht MD on 10/27/2017 at  8:07 PM         11/22/2017: Patient presents for follow-up evaluation of stage III left breast cancer with axillary metastasis.   Patient status post left breast lumpec No ultrasonographic correlation. Recommend further evaluation with tomosynthesis guided  stereotactic biopsy. Excisional biopsy is also a consideration as clinically indicated given history  of atypical ductal hyperplasia.   2. Previously biopsied right zee on 6/5/2018  Patient is without complaints.     11/7/2018:Patient presents for follow-up evaluation of stage III left breast cancer with axillary metastasis. Patient status post left breast lumpectomy with focused sentinel lymph node biopsy on 10/26/2017. of   Left breast infiltrating ductal carcinoma  Patient with right breast mass x3 with adh          POSTOPERATIVE DIAGNOSIS: Same Left   breast mass x 2           PROCEDURE PERFORMED: Right breast lumpectomy with wire localization   x2     SURGEON: Max Cabral examination.    ANY FURTHER EVALUATION SHOULD BE BASED ON CLINICAL ASSESSMENT  The Department of Radiology will inform the patient of their results by letter as well as contact  the patient for needed follow-up studies.     Gyne History:  Menarche at age 5 Valentín Haider MD at [facility]   • BENIGN BIOPSY RIGHT   03/2017   • BREAST BIOPSY/LUMPECTOMY  WITH NEEDLE LOCALIZATION UNILATERAL Right 6/5/2018     Performed by Db Ortega MD at [address] [place] [address]   • BREAST LUMPECTOMY Left 10/26/2017     Perf symptoms of depression or anxiety  HEMATOLOGY: denies hx anemia; denies bruising or excessive bleeding  ENDOCRINE: denies excessive thirst or urination; denies unexpected wt gain or wt loss  ALLERGY/IMM.: denies food or seasonal allergies     PHYSICAL EXAM palpable left breast mass as well as left axillary adenopathy. Patient with stage III left breast cancer positive axillary metastasis. Patient is receiving chemotherapy as well as Herceptin at this point.   Patient has seen medical oncology as well as radi understands, consents, and wishes to proceed with surgery. We will schedule removal of subcutaneous port under  MAC anesthesia at [address] [place] [address] on Thursday, July 11, 2019.   Due to the patient's BMI of 51 her surgery cannot be completed at the [facility] and must be comple

## 2019-06-27 NOTE — OPERATIVE REPORT
[facility] POST ANESTHESIA CARE UNIT OPERATIVE REPORT:     PATIENT NAME: Xin Hagen  : 4/3/1975   MRN: [id number]  SITE:   [address] OF OPERATION:   2019    PREOPERATIVE DIAGNOSIS: Finished treatment for breast cancer, Quiros including the catheter was completely removed without difficulty. The sheath was then closed with a figure-of-eight 2-0 Vicryl suture for hemostasis. There was no bleeding present. Electrocautery was used to the port cavity to prevent seroma formation.

## 2019-06-27 NOTE — BRIEF OP NOTE
Pre-Operative Diagnosis: breast cancer stage 3 nonfunctional port     Post-Operative Diagnosis: The same     Procedure Performed:   Procedure(s):  removal of subcutaneous lisa catheter with reservoir    Surgeon(s) and Role:     Troy Seaman MD -

## 2019-06-27 NOTE — ANESTHESIA PREPROCEDURE EVALUATION
Anesthesia PreOp Note    HPI:     Steven Carrasco is a 45year old female who presents for preoperative consultation requested by: Marilou Steel MD    Date of Surgery: 6/27/2019    Procedure(s):  CATHETER REMOVAL  Indication: breast cancer stage 3 left         Date Noted: 03/01/2017        Past Medical History:   Diagnosis Date   • Atypical hyperplasia of breast 2017 amy     adh    • Back problem     LOW BACK PAIN   • BRCA1 negative 2017   • BRCA2 negative 2017   • [facility]    dc Unknown time   ergocalciferol 09597 units Oral Cap TAKE 1 CAPSULE BY MOUTH 1 TIME A WEEK Disp: 13 capsule Rfl: 3 Past Week at Unknown time   Diclofenac Sodium 75 MG Oral Tab EC Take 1 tablet (75 mg total) by mouth 2 (two) times daily.  Disp: 180 tablet Rfl: clubs or organizations: Not on file        Relationship status: Not on file      Intimate partner violence:        Fear of current or ex partner: Not on file        Emotionally abused: Not on file        Physically abused: Not on file        Forced sexual have informed Chan Carne and/or legal guardian or family member of the nature of the anesthetic plan, benefits, risks including possible dental damage if relevant, major complications, and any alternative forms of anesthetic management.    All of the pa

## 2019-06-27 NOTE — ANESTHESIA POSTPROCEDURE EVALUATION
Patient: Mark Kirby    Procedure Summary     Date:  06/27/19 Room / Location:  [facility] OR 05 / [facility] OR    Anesthesia Start:  1305 Anesthesia Stop:      Procedure:  CATHETER REMOVAL (N/A ) Diagnosis:  (breast cancer stage 3)    Surgeon:  Deion Ibarra,

## 2019-09-11 ENCOUNTER — HOSPITAL (OUTPATIENT)
Dept: OTHER | Age: 44
End: 2019-09-11
Attending: OBSTETRICS & GYNECOLOGY

## 2019-09-14 PROCEDURE — 81001 URINALYSIS AUTO W/SCOPE: CPT | Performed by: INTERNAL MEDICINE

## 2019-09-14 PROCEDURE — 87086 URINE CULTURE/COLONY COUNT: CPT | Performed by: INTERNAL MEDICINE

## 2019-10-31 PROBLEM — N63.20 LEFT BREAST MASS: Status: ACTIVE | Noted: 2019-10-31

## 2019-11-05 PROCEDURE — 88305 TISSUE EXAM BY PATHOLOGIST: CPT | Performed by: RADIOLOGY

## 2019-11-05 PROCEDURE — 88344 IMHCHEM/IMCYTCHM EA MLT ANTB: CPT | Performed by: RADIOLOGY

## 2019-11-22 PROCEDURE — 88341 IMHCHEM/IMCYTCHM EA ADD ANTB: CPT | Performed by: INTERNAL MEDICINE

## 2019-11-22 PROCEDURE — 88360 TUMOR IMMUNOHISTOCHEM/MANUAL: CPT | Performed by: INTERNAL MEDICINE

## 2019-11-22 PROCEDURE — 88342 IMHCHEM/IMCYTCHM 1ST ANTB: CPT | Performed by: INTERNAL MEDICINE

## 2019-11-22 PROCEDURE — 88305 TISSUE EXAM BY PATHOLOGIST: CPT | Performed by: INTERNAL MEDICINE

## 2019-11-29 NOTE — H&P (VIEW-ONLY)
.   Patient presents with: Follow - Up: follow up breast cancer left     HPI:    Zahra Gilbert is a 45year old Black  female. The patient presents to the office for new left breast mass. It has been present since 2 weeks.  Previous breast procedures incl node, ultrasound-guided biopsy:  -Invasive ductal carcinoma.  -Amanuel grade 3 (Tubules: 3, Nuclear: 2, Mitoses: 3). -Longest length of invasive carcinoma 11 mm; 60% of submitted tissue.  -Lymphoid tissue not visualized.       Probe  Result  HER2/CEP17 right adnexa. This is suspicious for either a  synchronous or metastatic lesion. Further evaluation with pelvic MRI is recommended if indicated  Clinically. 3/29/2017: Patient presents for follow-up evaluation for port insertion.   Patient has no new c receiving chemotherapy as well as Herceptin   Patient completed chemotherapy 8/31/2017. Patient for preoperative discussion. Patient presented at multidisciplinary breast tumor board.   Recommendation was for consideration of left breast lumpectomy with f biopsy on 10/26/2017. Patient without complaints. OPERATION DATE:  10/26/2017     OPERATIVE REPORT     PREOPERATIVE DIAGNOSIS:  Preoperative stage III, left breast cancer. POSTOPERATIVE DIAGNOSIS:  Preoperative stage III, left breast cancer.    Roni Smalls left axillary lymph node metastasis showed strong ER positivity (90%), strong focal KS positivity (15%), unfavorable Ki-67 (45%) and equivocal HER-2/kaiden FISH analysis. The patient is status post preoperative neoadjuvant chemotherapy.   · Currently submitted hyperplasia, carcinoma in situ, infiltrating carcinoma, or other malignant neoplastic infiltrates identified.     E.   Left breast, lateral margin; wider excision:  · Portion of breast tissue demonstrating scattered foci of DCIS (largest aggregate focus - 5 by Justin Calderon MD on 10/27/2017 at  8:07 PM       11/22/2017: Patient presents for follow-up evaluation of stage III left breast cancer with axillary metastasis.   Patient status post left breast lumpectomy with focused sentinel lymph node biopsy on 10/ evaluation with tomosynthesis guided  stereotactic biopsy. Excisional biopsy is also a consideration as clinically indicated given history  of atypical ductal hyperplasia.   2. Previously biopsied right breast mass 10:00 position 10 cm from the nipple measu complaints. 11/7/2018:Patient presents for follow-up evaluation of stage III left breast cancer with axillary metastasis. Patient status post left breast lumpectomy with focused sentinel lymph node biopsy on 10/26/2017.   Patient is on herceptin and Pro carcinoma  Patient with right breast mass x3 with adh          POSTOPERATIVE DIAGNOSIS: Same Left   breast mass x 2           PROCEDURE PERFORMED: Right breast lumpectomy with wire localization   x2     SURGEON:  KADNY COHEN     ASST:       ([place] BE BASED ON CLINICAL ASSESSMENT  The Department of Radiology will inform the patient of their results by letter as well as contact  the patient for needed follow-up studies.         12/4/2019:Presents for follow-up evaluation with a history of breast cancer confirmation by left digital diagnostic mammogram.  3. See addendum for radiology pathology correlation. BI-RADS Final Assessment Category:  Post Procedure Mammograms for Marker Placement.   Management Recommendation: Assess radiologic/pathologic concordan tissue, consistent with breast primary.   -No lymphoid tissue identified     Antibody & Clone             Result Interpretation   Estrogen Receptor   -   SP1 30% Positive Cells weak Positive   Progesterone Receptor   -   16 0% Positive Cells  Negative   [facility] MAIN OR   • BENIGN BIOPSY RIGHT  03/2017   • BREAST BIOPSY/LUMPECTOMY  WITH NEEDLE LOCALIZATION UNILATERAL Right 6/5/2018    Performed by Papa Harrison MD at [facility]   • BREAST LUMPECTOMY Left 10/26/2017    Performed by Abel Atwood pain  NEURO: no sensory or motor complaint  PSYCHE: no symptoms of depression or anxiety  HEMATOLOGY: denies hx anemia; denies bruising or excessive bleeding  ENDOCRINE: denies excessive thirst or urination; denies unexpected wt gain or wt loss  ALLERGY/IM breast postlumpectomy radiation changes present. Left axillary wound healed. No tonsil lymphedema present. ASSESSMENT/ PLAN:   Grace Petty is a 45year old  Black female with a palpable left breast mass as well as left axillary adenopathy.  Patient wi direct patient contact and decision-making  And coordinating the patient's care including greater than 50% face-to-face was  40  minutes.      Elizabeth Matthew MD, Dr. Chasity Mooney, Dr. Dhiraj Roger

## 2019-12-04 PROBLEM — C77.1 NEOPLASM OF LEFT BREAST, REGIONAL LYMPH NODE STAGING CATEGORY N3B: METASTASIS IN IPSILATERAL INTERNAL MAMMARY LYMPH NODE AND AXILLARY LYMPH NODE (HCC): Status: ACTIVE | Noted: 2019-12-04

## 2019-12-04 PROBLEM — C77.3 NEOPLASM OF LEFT BREAST, REGIONAL LYMPH NODE STAGING CATEGORY N3B: METASTASIS IN IPSILATERAL INTERNAL MAMMARY LYMPH NODE AND AXILLARY LYMPH NODE (HCC): Status: ACTIVE | Noted: 2019-12-04

## 2019-12-04 PROBLEM — C50.912 NEOPLASM OF LEFT BREAST, REGIONAL LYMPH NODE STAGING CATEGORY N3B: METASTASIS IN IPSILATERAL INTERNAL MAMMARY LYMPH NODE AND AXILLARY LYMPH NODE (HCC): Status: ACTIVE | Noted: 2019-12-04

## 2019-12-10 ENCOUNTER — OFFICE VISIT (OUTPATIENT)
Dept: PHYSICAL THERAPY | Facility: HOSPITAL | Age: 44
End: 2019-12-10
Attending: PEDIATRICS
Payer: COMMERCIAL

## 2019-12-10 NOTE — PROGRESS NOTES
BREAST CANCER SURGICAL SCREENINGS  [facility] REHABILITATION      PATIENT SUMMARY:     Involved Side:       LEFT                                              Dominant Hand:   RIGHT                               Occupation:         Works in a school scale:      Pain scale:      Screening Therapist: Neela Chakraborty    Screening Therapist:     Screening Therapist:                                                                                        Right Left    Right Left    Right Left   Shoulder  A/SHAYY A/AAR PILLOW Supine w/1pillow   LIMB POSITION 75 deg abduction 75 DEG ABDUCTION 75 deg abduction   STARTING POINT 17cm from 3rd cuticle 17CM 17cm from 3rd cuticle   RIGHT HAND VOLUME - - 360 ml   LEFT HAND VOLUME - - 355 ml   MEASUREMENT A 17.5 16.6 17.7   MEASU

## 2020-01-02 ENCOUNTER — ANESTHESIA EVENT (OUTPATIENT)
Dept: SURGERY | Facility: HOSPITAL | Age: 45
End: 2020-01-02
Payer: COMMERCIAL

## 2020-01-02 ENCOUNTER — HOSPITAL ENCOUNTER (OUTPATIENT)
Facility: HOSPITAL | Age: 45
Setting detail: HOSPITAL OUTPATIENT SURGERY
Discharge: HOME | End: 2020-01-02
Attending: SURGERY | Admitting: SURGERY
Payer: COMMERCIAL

## 2020-01-02 ENCOUNTER — ANESTHESIA (OUTPATIENT)
Dept: SURGERY | Facility: HOSPITAL | Age: 45
End: 2020-01-02
Payer: COMMERCIAL

## 2020-01-02 VITALS
DIASTOLIC BLOOD PRESSURE: 67 MMHG | BODY MASS INDEX: 44.41 KG/M2 | TEMPERATURE: 97 F | SYSTOLIC BLOOD PRESSURE: 136 MMHG | HEIGHT: 68 IN | OXYGEN SATURATION: 97 % | HEART RATE: 56 BPM | WEIGHT: 293 LBS | RESPIRATION RATE: 18 BRPM

## 2020-01-02 LAB — B-HCG UR QL: NEGATIVE

## 2020-01-02 PROCEDURE — 88307 TISSUE EXAM BY PATHOLOGIST: CPT | Performed by: SURGERY

## 2020-01-02 PROCEDURE — 88374 M/PHMTRC ALYS ISHQUANT/SEMIQ: CPT | Performed by: PATHOLOGY

## 2020-01-02 PROCEDURE — 81025 URINE PREGNANCY TEST: CPT

## 2020-01-02 PROCEDURE — 94010 BREATHING CAPACITY TEST: CPT | Performed by: SURGERY

## 2020-01-02 PROCEDURE — 07T60ZZ RESECTION OF LEFT AXILLARY LYMPHATIC, OPEN APPROACH: ICD-10-PCS | Performed by: SURGERY

## 2020-01-02 PROCEDURE — 88360 TUMOR IMMUNOHISTOCHEM/MANUAL: CPT | Performed by: SURGERY

## 2020-01-02 RX ORDER — MIDAZOLAM HYDROCHLORIDE 1 MG/ML
INJECTION INTRAMUSCULAR; INTRAVENOUS AS NEEDED
Status: DISCONTINUED | OUTPATIENT
Start: 2020-01-02 | End: 2020-01-02 | Stop reason: SURG

## 2020-01-02 RX ORDER — KETOROLAC TROMETHAMINE 15 MG/ML
15 INJECTION, SOLUTION INTRAMUSCULAR; INTRAVENOUS EVERY 6 HOURS PRN
Status: DISCONTINUED | OUTPATIENT
Start: 2020-01-02 | End: 2020-01-02

## 2020-01-02 RX ORDER — HYDROCODONE BITARTRATE AND ACETAMINOPHEN 5; 325 MG/1; MG/1
1 TABLET ORAL AS NEEDED
Status: DISCONTINUED | OUTPATIENT
Start: 2020-01-02 | End: 2020-01-02

## 2020-01-02 RX ORDER — MORPHINE SULFATE 4 MG/ML
2 INJECTION, SOLUTION INTRAMUSCULAR; INTRAVENOUS EVERY 10 MIN PRN
Status: DISCONTINUED | OUTPATIENT
Start: 2020-01-02 | End: 2020-01-02

## 2020-01-02 RX ORDER — HYDROCODONE BITARTRATE AND ACETAMINOPHEN 5; 325 MG/1; MG/1
2 TABLET ORAL AS NEEDED
Status: DISCONTINUED | OUTPATIENT
Start: 2020-01-02 | End: 2020-01-02

## 2020-01-02 RX ORDER — DOCUSATE SODIUM 100 MG/1
100 CAPSULE, LIQUID FILLED ORAL 2 TIMES DAILY
Qty: 14 CAPSULE | Refills: 0 | Status: SHIPPED | OUTPATIENT
Start: 2020-01-02 | End: 2020-01-09

## 2020-01-02 RX ORDER — MORPHINE SULFATE 4 MG/ML
4 INJECTION, SOLUTION INTRAMUSCULAR; INTRAVENOUS EVERY 2 HOUR PRN
Status: DISCONTINUED | OUTPATIENT
Start: 2020-01-02 | End: 2020-01-02

## 2020-01-02 RX ORDER — METOCLOPRAMIDE 10 MG/1
10 TABLET ORAL ONCE
Status: COMPLETED | OUTPATIENT
Start: 2020-01-02 | End: 2020-01-02

## 2020-01-02 RX ORDER — METOCLOPRAMIDE HYDROCHLORIDE 5 MG/ML
10 INJECTION INTRAMUSCULAR; INTRAVENOUS EVERY 8 HOURS PRN
Status: DISCONTINUED | OUTPATIENT
Start: 2020-01-02 | End: 2020-01-02

## 2020-01-02 RX ORDER — HYDROMORPHONE HYDROCHLORIDE 1 MG/ML
0.4 INJECTION, SOLUTION INTRAMUSCULAR; INTRAVENOUS; SUBCUTANEOUS EVERY 5 MIN PRN
Status: DISCONTINUED | OUTPATIENT
Start: 2020-01-02 | End: 2020-01-02

## 2020-01-02 RX ORDER — ONDANSETRON 2 MG/ML
8 INJECTION INTRAMUSCULAR; INTRAVENOUS EVERY 6 HOURS PRN
Status: DISCONTINUED | OUTPATIENT
Start: 2020-01-02 | End: 2020-01-02

## 2020-01-02 RX ORDER — MORPHINE SULFATE 4 MG/ML
2 INJECTION, SOLUTION INTRAMUSCULAR; INTRAVENOUS EVERY 2 HOUR PRN
Status: DISCONTINUED | OUTPATIENT
Start: 2020-01-02 | End: 2020-01-02

## 2020-01-02 RX ORDER — ONDANSETRON 2 MG/ML
4 INJECTION INTRAMUSCULAR; INTRAVENOUS ONCE AS NEEDED
Status: DISCONTINUED | OUTPATIENT
Start: 2020-01-02 | End: 2020-01-02

## 2020-01-02 RX ORDER — ONDANSETRON 2 MG/ML
INJECTION INTRAMUSCULAR; INTRAVENOUS AS NEEDED
Status: DISCONTINUED | OUTPATIENT
Start: 2020-01-02 | End: 2020-01-02 | Stop reason: SURG

## 2020-01-02 RX ORDER — SODIUM CHLORIDE, SODIUM LACTATE, POTASSIUM CHLORIDE, CALCIUM CHLORIDE 600; 310; 30; 20 MG/100ML; MG/100ML; MG/100ML; MG/100ML
INJECTION, SOLUTION INTRAVENOUS CONTINUOUS
Status: DISCONTINUED | OUTPATIENT
Start: 2020-01-02 | End: 2020-01-02

## 2020-01-02 RX ORDER — HYDROMORPHONE HYDROCHLORIDE 1 MG/ML
0.6 INJECTION, SOLUTION INTRAMUSCULAR; INTRAVENOUS; SUBCUTANEOUS EVERY 5 MIN PRN
Status: DISCONTINUED | OUTPATIENT
Start: 2020-01-02 | End: 2020-01-02

## 2020-01-02 RX ORDER — FAMOTIDINE 20 MG/1
20 TABLET ORAL ONCE
Status: COMPLETED | OUTPATIENT
Start: 2020-01-02 | End: 2020-01-02

## 2020-01-02 RX ORDER — HALOPERIDOL 5 MG/ML
0.25 INJECTION INTRAMUSCULAR ONCE AS NEEDED
Status: DISCONTINUED | OUTPATIENT
Start: 2020-01-02 | End: 2020-01-02

## 2020-01-02 RX ORDER — EPHEDRINE SULFATE 50 MG/ML
INJECTION, SOLUTION INTRAVENOUS AS NEEDED
Status: DISCONTINUED | OUTPATIENT
Start: 2020-01-02 | End: 2020-01-02 | Stop reason: SURG

## 2020-01-02 RX ORDER — ROCURONIUM BROMIDE 10 MG/ML
INJECTION, SOLUTION INTRAVENOUS AS NEEDED
Status: DISCONTINUED | OUTPATIENT
Start: 2020-01-02 | End: 2020-01-02 | Stop reason: SURG

## 2020-01-02 RX ORDER — MORPHINE SULFATE 4 MG/ML
4 INJECTION, SOLUTION INTRAMUSCULAR; INTRAVENOUS EVERY 10 MIN PRN
Status: DISCONTINUED | OUTPATIENT
Start: 2020-01-02 | End: 2020-01-02

## 2020-01-02 RX ORDER — TRAMADOL HYDROCHLORIDE 50 MG/1
100 TABLET ORAL EVERY 6 HOURS PRN
Status: DISCONTINUED | OUTPATIENT
Start: 2020-01-02 | End: 2020-01-02

## 2020-01-02 RX ORDER — KETOROLAC TROMETHAMINE 30 MG/ML
30 INJECTION, SOLUTION INTRAMUSCULAR; INTRAVENOUS EVERY 6 HOURS PRN
Status: DISCONTINUED | OUTPATIENT
Start: 2020-01-02 | End: 2020-01-02

## 2020-01-02 RX ORDER — MORPHINE SULFATE 10 MG/ML
6 INJECTION, SOLUTION INTRAMUSCULAR; INTRAVENOUS EVERY 10 MIN PRN
Status: DISCONTINUED | OUTPATIENT
Start: 2020-01-02 | End: 2020-01-02

## 2020-01-02 RX ORDER — DEXAMETHASONE SODIUM PHOSPHATE 4 MG/ML
VIAL (ML) INJECTION AS NEEDED
Status: DISCONTINUED | OUTPATIENT
Start: 2020-01-02 | End: 2020-01-02 | Stop reason: SURG

## 2020-01-02 RX ORDER — PROCHLORPERAZINE EDISYLATE 5 MG/ML
5 INJECTION INTRAMUSCULAR; INTRAVENOUS ONCE AS NEEDED
Status: COMPLETED | OUTPATIENT
Start: 2020-01-02 | End: 2020-01-02

## 2020-01-02 RX ORDER — MORPHINE SULFATE 4 MG/ML
6 INJECTION, SOLUTION INTRAMUSCULAR; INTRAVENOUS EVERY 2 HOUR PRN
Status: DISCONTINUED | OUTPATIENT
Start: 2020-01-02 | End: 2020-01-02

## 2020-01-02 RX ORDER — HYDROMORPHONE HYDROCHLORIDE 1 MG/ML
0.2 INJECTION, SOLUTION INTRAMUSCULAR; INTRAVENOUS; SUBCUTANEOUS EVERY 5 MIN PRN
Status: DISCONTINUED | OUTPATIENT
Start: 2020-01-02 | End: 2020-01-02

## 2020-01-02 RX ORDER — ACETAMINOPHEN 500 MG
1000 TABLET ORAL ONCE
Status: COMPLETED | OUTPATIENT
Start: 2020-01-02 | End: 2020-01-02

## 2020-01-02 RX ORDER — NALOXONE HYDROCHLORIDE 0.4 MG/ML
80 INJECTION, SOLUTION INTRAMUSCULAR; INTRAVENOUS; SUBCUTANEOUS AS NEEDED
Status: DISCONTINUED | OUTPATIENT
Start: 2020-01-02 | End: 2020-01-02

## 2020-01-02 RX ORDER — LIDOCAINE HYDROCHLORIDE 10 MG/ML
INJECTION, SOLUTION EPIDURAL; INFILTRATION; INTRACAUDAL; PERINEURAL AS NEEDED
Status: DISCONTINUED | OUTPATIENT
Start: 2020-01-02 | End: 2020-01-02 | Stop reason: SURG

## 2020-01-02 RX ADMIN — MIDAZOLAM HYDROCHLORIDE 2 MG: 1 INJECTION INTRAMUSCULAR; INTRAVENOUS at 12:01:00

## 2020-01-02 RX ADMIN — EPHEDRINE SULFATE 5 MG: 50 INJECTION, SOLUTION INTRAVENOUS at 13:45:00

## 2020-01-02 RX ADMIN — ONDANSETRON 4 MG: 2 INJECTION INTRAMUSCULAR; INTRAVENOUS at 14:01:00

## 2020-01-02 RX ADMIN — ROCURONIUM BROMIDE 50 MG: 10 INJECTION, SOLUTION INTRAVENOUS at 12:01:00

## 2020-01-02 RX ADMIN — EPHEDRINE SULFATE 10 MG: 50 INJECTION, SOLUTION INTRAVENOUS at 12:57:00

## 2020-01-02 RX ADMIN — DEXAMETHASONE SODIUM PHOSPHATE 8 MG: 4 MG/ML VIAL (ML) INJECTION at 12:34:00

## 2020-01-02 RX ADMIN — LIDOCAINE HYDROCHLORIDE 100 MG: 10 INJECTION, SOLUTION EPIDURAL; INFILTRATION; INTRACAUDAL; PERINEURAL at 12:01:00

## 2020-01-02 RX ADMIN — EPHEDRINE SULFATE 10 MG: 50 INJECTION, SOLUTION INTRAVENOUS at 13:10:00

## 2020-01-02 RX ADMIN — SODIUM CHLORIDE, SODIUM LACTATE, POTASSIUM CHLORIDE, CALCIUM CHLORIDE: 600; 310; 30; 20 INJECTION, SOLUTION INTRAVENOUS at 14:29:00

## 2020-01-02 NOTE — ANESTHESIA PREPROCEDURE EVALUATION
Anesthesia PreOp Note    HPI:     Jodie Kelsey is a 45year old female who presents for preoperative consultation requested by: Jayme Adams MD    Date of Surgery: 1/2/2020    Procedure(s):  AXILLARY NODE DISSECTION  Indication: recurrent metastat [facility])         Date Noted: 06/08/2017      Cyst of right ovary         Date Noted: 03/21/2017      Malignant neoplasm of left female breast [facility])         Date Noted: 03/14/2017      Breast cancer, stage 3, left ([facility] [address]         Date Noted: 03/03/2017      Marcin Meléndez OTHER SURGICAL HISTORY  2017    Dignity port   • PORT REMOVAL     • PORT, INDWELLING, IMP     • RADIATION LEFT  2018   • SENTINEL LYMPH NODE BIOPSY Left 10/26/2017    Performed by Sallye Najjar, MD at [facility] MAIN OR       ANASTROZOLE 1 MG Oral Tab tab, T Drug use: No      Sexual activity: Not Currently    Lifestyle      Physical activity:        Days per week: Not on file        Minutes per session: Not on file      Stress: Not on file    Relationships      Social connections:        Talks on phone: Not on ROM: full  Dental - normal exam     Pulmonary - negative ROS and normal exam   Cardiovascular - normal exam    ECG reviewed  ROS comment: Echo: NLLVF    Neuro/Psych - negative ROS   (+)  neuromuscular disease (chronic back pain),       GI/Hepatic/Renal - n

## 2020-01-02 NOTE — BRIEF OP NOTE
Pre-Operative Diagnosis: recurrent metastatic left breast cancer     Post-Operative Diagnosis: recurrent metastatic left breast cancer      Procedure Performed:   Procedure(s):  left axillary lymph node dissection complete    Surgeon(s) and Role:     * Mar

## 2020-01-02 NOTE — ANESTHESIA PROCEDURE NOTES
Airway  Urgency: elective      General Information and Staff    Patient location during procedure: OR  Anesthesiologist: Dennie Leriche, MD  Resident/CRNA: Josie Gonsales CRNA  Performed: CRNA     Indications and Patient Condition  Indications for airway ma

## 2020-01-02 NOTE — INTERVAL H&P NOTE
Pre-op Diagnosis: recurrent metastatic left breast cancer    The above referenced H&P was reviewed by Velvet Roe MD on 1/2/2020, the patient was examined and no significant changes have occurred in the patient's condition since the H&P was performe

## 2020-01-02 NOTE — ANESTHESIA POSTPROCEDURE EVALUATION
Patient: Steven Carrasco    Procedure Summary     Date:  01/02/20 Room / Location:  [facility] OR 05 / [facility] OR    Anesthesia Start:  5061 Anesthesia Stop:      Procedure:  AXILLARY NODE DISSECTION (Left ) Diagnosis:  (recurrent metastatic left breast canc

## 2020-01-03 NOTE — OPERATIVE REPORT
[facility]    PATIENT'S NAME: Goldie CruzGinocally NICOLE   ATTENDING PHYSICIAN: Candelaria Castillo MD   OPERATING PHYSICIAN: Candelaria Castillo MD   PATIENT ACCOUNT#:   [de-identified]    LOCATION:  [place] [address]  MEDICAL RECORD #:   [id number] TECHNIQUE:  Patient brought to the operating room and placed on the operating table in supine position. General anesthetic was administered via endotracheal tube by Anesthesia.   The patient's left axilla, arm, and chest were prepped and draped in routine Pathology en bloc. A complete axillary lymph node dissection was performed. The long thoracic and thoracodorsal nerves were identified and preserved throughout the operation. Axillary vein was identified and preserved throughout the operation.   The woun

## 2020-01-05 NOTE — PROGRESS NOTES
Please call patient let her know 5 out of 8 lymph nodes positive for cancer. This is what we expected.   I believe patient is scheduled to see me  Friday this coming week

## 2020-01-19 PROBLEM — C50.912 CARCINOMA OF LEFT BREAST, ESTROGEN AND PROGESTERONE RECEPTOR POSITIVE (HCC): Status: RESOLVED | Noted: 2017-11-22 | Resolved: 2020-01-19

## 2020-01-19 PROBLEM — Z90.722 S/P TOTAL HYSTERECTOMY AND BSO (BILATERAL SALPINGO-OOPHORECTOMY): Status: ACTIVE | Noted: 2020-01-19

## 2020-01-19 PROBLEM — Z17.0 CARCINOMA OF LEFT BREAST, ESTROGEN AND PROGESTERONE RECEPTOR POSITIVE (HCC): Status: RESOLVED | Noted: 2017-11-22 | Resolved: 2020-01-19

## 2020-01-19 PROBLEM — N63.10 BREAST MASS, RIGHT: Status: RESOLVED | Noted: 2017-10-02 | Resolved: 2020-01-19

## 2020-01-19 PROBLEM — Z90.710 S/P TOTAL HYSTERECTOMY AND BSO (BILATERAL SALPINGO-OOPHORECTOMY): Status: ACTIVE | Noted: 2020-01-19

## 2020-01-19 PROBLEM — Z90.79 S/P TOTAL HYSTERECTOMY AND BSO (BILATERAL SALPINGO-OOPHORECTOMY): Status: ACTIVE | Noted: 2020-01-19

## 2020-01-19 PROBLEM — Z90.722 S/P BSO (BILATERAL SALPINGO-OOPHORECTOMY): Status: ACTIVE | Noted: 2020-01-19

## 2020-02-03 ENCOUNTER — OFFICE VISIT (OUTPATIENT)
Dept: PHYSICAL THERAPY | Facility: HOSPITAL | Age: 45
End: 2020-02-03
Attending: SURGERY
Payer: COMMERCIAL

## 2020-02-03 DIAGNOSIS — C50.919 BREAST CA (HCC): Primary | ICD-10-CM

## 2020-02-03 DIAGNOSIS — I89.0 LYMPHEDEMA: ICD-10-CM

## 2020-02-03 NOTE — PROGRESS NOTES
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   Upon assessment of LUE AROM in supine it was noted that pt's axillary incision was opening: (had pt sign consent for picture)   \  The picture was sent to Dr. Saravanan Herman and verbal instructions were Cording (grade 0-3):     Cording (grade 0-3):     Cording (grade 0-3):      R: 0    R: 0    R:      L: 0    L: 0    L:      Miscellaneous:      Miscellaneous:      Miscellaneous:                                                                       Any fee MEASUREMENT K - - 49 51.8    TOTAL VOLUME  [id number] 4084.9030 1644.38481 [phone]   DATE MEASURED 12/10/2019 12/10/2019 11/22/2017 8/9/2017   LOCATION/MEASUREMENTS RUE RUXIN ARMIJO RUXIN   PATIENT POSITION  - - SUPINE 1 PILLOW SUPINE w/1 pillow   LIMB POSITIO

## 2020-02-03 NOTE — PATIENT INSTRUCTIONS
Instructions per Dr. Russell Masters re: L axillary incision:    -remove gauze pad     -Twice daily wash the area with 1/2 saline and 1/2 hydrogen peroxided  -place neosporin on the 2x2 pads and cover the open part of the incision.  - use paper tape to hold down

## 2020-02-04 ENCOUNTER — TELEPHONE (OUTPATIENT)
Dept: PHYSICAL THERAPY | Facility: HOSPITAL | Age: 45
End: 2020-02-04

## 2020-03-18 PROBLEM — C79.51 BONE METASTASES (HCC): Status: ACTIVE | Noted: 2020-03-18

## 2020-03-30 ENCOUNTER — APPOINTMENT (OUTPATIENT)
Dept: PHYSICAL THERAPY | Facility: HOSPITAL | Age: 45
End: 2020-03-30
Attending: SURGERY
Payer: COMMERCIAL

## 2020-04-01 ENCOUNTER — OFFICE VISIT (OUTPATIENT)
Dept: PHYSICAL THERAPY | Facility: HOSPITAL | Age: 45
End: 2020-04-01
Attending: SURGERY
Payer: COMMERCIAL

## 2020-04-01 NOTE — PROGRESS NOTES
BREAST CANCER SURGICAL SCREENINGS  [facility] REHABILITATION      PATIENT SUMMARY:     Involved Side:       LEFT                                              Dominant Hand:   RIGHT                               Occupation:         Works in a school Right Left    Right Left    Right Left   Shoulder  A/AAROM A/AAROM  Shoulder  A/AAROM A/AAROM  Shoulder  A/AAROM A/AAROM   Supine flex    Supine flex    Supine flex      abd     abd     abd      ER     ER Arm Volume **Pt reports that she has gained about 60lbs since the last time she was here. Pt is noted to have increased B arm volume.     Arm Volume Pt also reports that she fell about 8-9 months ago landing on her L hand and injurin MEASUREMENT F 33.5 33.5 31 32.5   MEASUREMENT G 41.2 39.8 34.5 38.4   MEASUREMENT H 46.2 44.5 38.8 43   MEASUREMENT I 51 51.5 43.5 45   MEASUREMENT J 53.9 54.3 48.9 49.3   MEASUREMENT K - - 48.9 48.8    TOTAL VOLUME  2723.7118 1628.96761 [phone] 5197.

## 2020-10-21 PROBLEM — M79.645 THUMB PAIN, LEFT: Status: ACTIVE | Noted: 2020-10-21

## 2021-03-22 PROCEDURE — 88377 M/PHMTRC ALYS ISHQUANT/SEMIQ: CPT | Performed by: INTERNAL MEDICINE

## 2021-03-22 PROCEDURE — 88360 TUMOR IMMUNOHISTOCHEM/MANUAL: CPT | Performed by: INTERNAL MEDICINE

## 2021-03-22 PROCEDURE — 88307 TISSUE EXAM BY PATHOLOGIST: CPT | Performed by: INTERNAL MEDICINE

## 2021-03-22 PROCEDURE — 88342 IMHCHEM/IMCYTCHM 1ST ANTB: CPT | Performed by: INTERNAL MEDICINE

## 2021-04-02 RX ORDER — FEXOFENADINE HCL 180 MG/1
180 TABLET ORAL DAILY PRN
COMMUNITY

## 2021-04-02 SDOH — HEALTH STABILITY: MENTAL HEALTH: HOW OFTEN DO YOU HAVE A DRINK CONTAINING ALCOHOL?: NEVER

## 2021-04-02 ASSESSMENT — ACTIVITIES OF DAILY LIVING (ADL)
ADL_SCORE: 12
ADL_BEFORE_ADMISSION: INDEPENDENT
SENSORY_SUPPORT_DEVICES: EYEGLASSES

## 2021-04-06 ENCOUNTER — HOSPITAL ENCOUNTER (OUTPATIENT)
Dept: LAB | Age: 46
Discharge: HOME | End: 2021-04-06
Attending: SURGERY

## 2021-04-06 DIAGNOSIS — Z01.812 PRE-PROCEDURAL LABORATORY EXAMINATIONS: ICD-10-CM

## 2021-04-06 PROCEDURE — U0005 INFEC AGEN DETEC AMPLI PROBE: HCPCS | Performed by: SURGERY

## 2021-04-07 LAB
SARS-COV-2 RNA RESP QL NAA+PROBE: NOT DETECTED
SERVICE CMNT-IMP: NORMAL
SERVICE CMNT-IMP: NORMAL

## 2021-04-08 ENCOUNTER — APPOINTMENT (OUTPATIENT)
Dept: GENERAL RADIOLOGY | Age: 46
End: 2021-04-08
Attending: SURGERY

## 2021-04-08 ENCOUNTER — ANESTHESIA EVENT (OUTPATIENT)
Dept: SURGERY | Age: 46
End: 2021-04-08

## 2021-04-08 ENCOUNTER — HOSPITAL ENCOUNTER (OUTPATIENT)
Age: 46
Discharge: HOME | End: 2021-04-08
Attending: SURGERY | Admitting: SURGERY

## 2021-04-08 ENCOUNTER — ANESTHESIA (OUTPATIENT)
Dept: SURGERY | Age: 46
End: 2021-04-08

## 2021-04-08 DIAGNOSIS — Z01.812 PRE-PROCEDURAL LABORATORY EXAMINATIONS: Primary | ICD-10-CM

## 2021-04-08 PROCEDURE — 10002807 HB RX 258: Performed by: GENERAL ACUTE CARE HOSPITAL

## 2021-04-08 PROCEDURE — 10002800 HB RX 250 W HCPCS: Performed by: SURGERY

## 2021-04-08 PROCEDURE — 10002803 HB RX 637: Performed by: GENERAL ACUTE CARE HOSPITAL

## 2021-04-08 PROCEDURE — 10006027 HB SUPPLY 278: Performed by: SURGERY

## 2021-04-08 PROCEDURE — C1788 PORT, INDWELLING, IMP: HCPCS | Performed by: SURGERY

## 2021-04-08 PROCEDURE — 10004452 HB PACU ADDL 30 MINUTES: Performed by: SURGERY

## 2021-04-08 PROCEDURE — C1769 GUIDE WIRE: HCPCS | Performed by: SURGERY

## 2021-04-08 PROCEDURE — 10006023 HB SUPPLY 272: Performed by: SURGERY

## 2021-04-08 PROCEDURE — 10002800 HB RX 250 W HCPCS: Performed by: GENERAL ACUTE CARE HOSPITAL

## 2021-04-08 PROCEDURE — 13000034 HB BASIC CASE  S/U +1ST 15 MIN: Performed by: SURGERY

## 2021-04-08 PROCEDURE — 13000035 HB BASIC CASE EA ADD MINUTE: Performed by: SURGERY

## 2021-04-08 PROCEDURE — 10002800 HB RX 250 W HCPCS

## 2021-04-08 PROCEDURE — 13000006 HB ANESTHESIA MAC S/U + 1ST 15 MIN: Performed by: SURGERY

## 2021-04-08 PROCEDURE — 10002800 HB RX 250 W HCPCS: Performed by: NURSE ANESTHETIST, CERTIFIED REGISTERED

## 2021-04-08 PROCEDURE — 13000007 HB ANESTHESIA MAC EA ADD MINUTE: Performed by: SURGERY

## 2021-04-08 PROCEDURE — 10002801 HB RX 250 W/O HCPCS: Performed by: NURSE ANESTHETIST, CERTIFIED REGISTERED

## 2021-04-08 PROCEDURE — 10004451 HB PACU RECOVERY 1ST 30 MINUTES: Performed by: SURGERY

## 2021-04-08 PROCEDURE — 76000 FLUOROSCOPY <1 HR PHYS/QHP: CPT

## 2021-04-08 PROCEDURE — 10002803 HB RX 637: Performed by: SURGERY

## 2021-04-08 PROCEDURE — 13000001 HB PHASE II RECOVERY EA 30 MINUTES: Performed by: SURGERY

## 2021-04-08 PROCEDURE — 10002801 HB RX 250 W/O HCPCS: Performed by: SURGERY

## 2021-04-08 PROCEDURE — 10002807 HB RX 258: Performed by: NURSE ANESTHETIST, CERTIFIED REGISTERED

## 2021-04-08 PROCEDURE — 71045 X-RAY EXAM CHEST 1 VIEW: CPT

## 2021-04-08 DEVICE — POWERPORT M.R.I. IMPLANTABLE PORT WITH ATTACHABLE 8F CHRONOFLEX OPEN-ENDED SINGLE-LUMEN VENOUS CATHETER INTERMEDIATE KIT  (WITHOUT SUTURE PLUGS)
Type: IMPLANTABLE DEVICE | Site: CHEST | Status: FUNCTIONAL
Brand: POWERPORT M.R.I., CHRONOFLEX

## 2021-04-08 RX ORDER — LIDOCAINE HYDROCHLORIDE 20 MG/ML
INJECTION, SOLUTION INFILTRATION; PERINEURAL PRN
Status: DISCONTINUED | OUTPATIENT
Start: 2021-04-08 | End: 2021-04-08

## 2021-04-08 RX ORDER — DEXTROSE MONOHYDRATE 25 G/50ML
25 INJECTION, SOLUTION INTRAVENOUS PRN
Status: DISCONTINUED | OUTPATIENT
Start: 2021-04-08 | End: 2021-04-08 | Stop reason: HOSPADM

## 2021-04-08 RX ORDER — ACETAMINOPHEN 325 MG/1
650 TABLET ORAL EVERY 4 HOURS PRN
Status: DISCONTINUED | OUTPATIENT
Start: 2021-04-08 | End: 2021-04-08 | Stop reason: HOSPADM

## 2021-04-08 RX ORDER — ALBUTEROL SULFATE 2.5 MG/3ML
5 SOLUTION RESPIRATORY (INHALATION) ONCE
Status: DISCONTINUED | OUTPATIENT
Start: 2021-04-08 | End: 2021-04-08 | Stop reason: HOSPADM

## 2021-04-08 RX ORDER — HYDRALAZINE HYDROCHLORIDE 20 MG/ML
5 INJECTION INTRAMUSCULAR; INTRAVENOUS EVERY 10 MIN PRN
Status: DISCONTINUED | OUTPATIENT
Start: 2021-04-08 | End: 2021-04-08 | Stop reason: HOSPADM

## 2021-04-08 RX ORDER — METOPROLOL SUCCINATE 25 MG/1
25 TABLET, EXTENDED RELEASE ORAL
Status: DISCONTINUED | OUTPATIENT
Start: 2021-04-08 | End: 2021-04-08 | Stop reason: HOSPADM

## 2021-04-08 RX ORDER — DIPHENHYDRAMINE HYDROCHLORIDE 50 MG/ML
25 INJECTION INTRAMUSCULAR; INTRAVENOUS EVERY 4 HOURS PRN
Status: DISCONTINUED | OUTPATIENT
Start: 2021-04-08 | End: 2021-04-08 | Stop reason: HOSPADM

## 2021-04-08 RX ORDER — HEPARIN SODIUM 1000 [USP'U]/ML
INJECTION, SOLUTION INTRAVENOUS; SUBCUTANEOUS PRN
Status: DISCONTINUED | OUTPATIENT
Start: 2021-04-08 | End: 2021-04-08 | Stop reason: HOSPADM

## 2021-04-08 RX ORDER — ONDANSETRON 2 MG/ML
4 INJECTION INTRAMUSCULAR; INTRAVENOUS 2 TIMES DAILY PRN
Status: DISCONTINUED | OUTPATIENT
Start: 2021-04-08 | End: 2021-04-08 | Stop reason: HOSPADM

## 2021-04-08 RX ORDER — MIDAZOLAM HYDROCHLORIDE 1 MG/ML
INJECTION, SOLUTION INTRAMUSCULAR; INTRAVENOUS PRN
Status: DISCONTINUED | OUTPATIENT
Start: 2021-04-08 | End: 2021-04-08

## 2021-04-08 RX ORDER — SCOLOPAMINE TRANSDERMAL SYSTEM 1 MG/1
1 PATCH, EXTENDED RELEASE TRANSDERMAL
Status: DISCONTINUED | OUTPATIENT
Start: 2021-04-08 | End: 2021-04-08 | Stop reason: HOSPADM

## 2021-04-08 RX ORDER — DIPHENHYDRAMINE HYDROCHLORIDE 50 MG/ML
INJECTION INTRAMUSCULAR; INTRAVENOUS
Status: DISCONTINUED
Start: 2021-04-08 | End: 2021-04-08 | Stop reason: HOSPADM

## 2021-04-08 RX ORDER — SODIUM CHLORIDE, SODIUM LACTATE, POTASSIUM CHLORIDE, CALCIUM CHLORIDE 600; 310; 30; 20 MG/100ML; MG/100ML; MG/100ML; MG/100ML
INJECTION, SOLUTION INTRAVENOUS CONTINUOUS
Status: DISCONTINUED | OUTPATIENT
Start: 2021-04-08 | End: 2021-04-08 | Stop reason: HOSPADM

## 2021-04-08 RX ORDER — FAMOTIDINE 20 MG/1
20 TABLET, FILM COATED ORAL
Status: COMPLETED | OUTPATIENT
Start: 2021-04-08 | End: 2021-04-08

## 2021-04-08 RX ORDER — KETAMINE HYDROCHLORIDE 50 MG/ML
INJECTION, SOLUTION, CONCENTRATE INTRAMUSCULAR; INTRAVENOUS PRN
Status: DISCONTINUED | OUTPATIENT
Start: 2021-04-08 | End: 2021-04-08

## 2021-04-08 RX ORDER — METOCLOPRAMIDE HYDROCHLORIDE 5 MG/ML
5 INJECTION INTRAMUSCULAR; INTRAVENOUS EVERY 6 HOURS PRN
Status: DISCONTINUED | OUTPATIENT
Start: 2021-04-08 | End: 2021-04-08 | Stop reason: HOSPADM

## 2021-04-08 RX ORDER — SODIUM CHLORIDE 9 MG/ML
INJECTION, SOLUTION INTRAVENOUS CONTINUOUS
Status: DISCONTINUED | OUTPATIENT
Start: 2021-04-08 | End: 2021-04-08 | Stop reason: HOSPADM

## 2021-04-08 RX ORDER — DEXAMETHASONE SODIUM PHOSPHATE 4 MG/ML
4 INJECTION, SOLUTION INTRA-ARTICULAR; INTRALESIONAL; INTRAMUSCULAR; INTRAVENOUS; SOFT TISSUE
Status: DISCONTINUED | OUTPATIENT
Start: 2021-04-08 | End: 2021-04-08 | Stop reason: HOSPADM

## 2021-04-08 RX ORDER — MIDAZOLAM HYDROCHLORIDE 1 MG/ML
2 INJECTION, SOLUTION INTRAMUSCULAR; INTRAVENOUS
Status: DISCONTINUED | OUTPATIENT
Start: 2021-04-08 | End: 2021-04-08 | Stop reason: HOSPADM

## 2021-04-08 RX ORDER — 0.9 % SODIUM CHLORIDE 0.9 %
2 VIAL (ML) INJECTION EVERY 12 HOURS SCHEDULED
Status: DISCONTINUED | OUTPATIENT
Start: 2021-04-08 | End: 2021-04-08 | Stop reason: HOSPADM

## 2021-04-08 RX ORDER — LIDOCAINE HYDROCHLORIDE 10 MG/ML
5-10 INJECTION, SOLUTION INFILTRATION; PERINEURAL PRN
Status: DISCONTINUED | OUTPATIENT
Start: 2021-04-08 | End: 2021-04-08 | Stop reason: HOSPADM

## 2021-04-08 RX ORDER — HUMAN INSULIN 100 [IU]/ML
INJECTION, SOLUTION SUBCUTANEOUS
Status: DISCONTINUED | OUTPATIENT
Start: 2021-04-08 | End: 2021-04-08 | Stop reason: HOSPADM

## 2021-04-08 RX ORDER — ONDANSETRON 2 MG/ML
INJECTION INTRAMUSCULAR; INTRAVENOUS PRN
Status: DISCONTINUED | OUTPATIENT
Start: 2021-04-08 | End: 2021-04-08

## 2021-04-08 RX ORDER — GLYCOPYRROLATE 0.2 MG/ML
INJECTION, SOLUTION INTRAMUSCULAR; INTRAVENOUS PRN
Status: DISCONTINUED | OUTPATIENT
Start: 2021-04-08 | End: 2021-04-08

## 2021-04-08 RX ORDER — SODIUM CHLORIDE, SODIUM LACTATE, POTASSIUM CHLORIDE, CALCIUM CHLORIDE 600; 310; 30; 20 MG/100ML; MG/100ML; MG/100ML; MG/100ML
INJECTION, SOLUTION INTRAVENOUS CONTINUOUS PRN
Status: DISCONTINUED | OUTPATIENT
Start: 2021-04-08 | End: 2021-04-08

## 2021-04-08 RX ADMIN — Medication 3000 MG: at 13:50

## 2021-04-08 RX ADMIN — DIPHENHYDRAMINE HYDROCHLORIDE 25 MG: 50 INJECTION INTRAMUSCULAR; INTRAVENOUS at 15:02

## 2021-04-08 RX ADMIN — FENTANYL CITRATE 25 MCG: 50 INJECTION, SOLUTION INTRAMUSCULAR; INTRAVENOUS at 14:37

## 2021-04-08 RX ADMIN — SCOPOLAMINE 1 PATCH: 1 PATCH TRANSDERMAL at 13:36

## 2021-04-08 RX ADMIN — ACETAMINOPHEN AND CODEINE PHOSPHATE 1 TABLET: 300; 30 TABLET ORAL at 16:44

## 2021-04-08 RX ADMIN — MIDAZOLAM HYDROCHLORIDE 2 MG: 1 INJECTION, SOLUTION INTRAMUSCULAR; INTRAVENOUS at 13:39

## 2021-04-08 RX ADMIN — FAMOTIDINE 20 MG: 20 TABLET ORAL at 12:37

## 2021-04-08 RX ADMIN — KETAMINE HYDROCHLORIDE 20 MG: 50 INJECTION, SOLUTION INTRAMUSCULAR; INTRAVENOUS at 13:53

## 2021-04-08 RX ADMIN — LIDOCAINE HYDROCHLORIDE 5 ML: 20 INJECTION, SOLUTION INFILTRATION; PERINEURAL at 13:47

## 2021-04-08 RX ADMIN — PROPOFOL 75 MCG/KG/MIN: 10 INJECTION, EMULSION INTRAVENOUS at 13:47

## 2021-04-08 RX ADMIN — GLYCOPYRROLATE 0.2 MG: 0.2 INJECTION, SOLUTION INTRAMUSCULAR; INTRAVENOUS at 13:53

## 2021-04-08 RX ADMIN — FENTANYL CITRATE 50 MCG: 50 INJECTION, SOLUTION INTRAMUSCULAR; INTRAVENOUS at 13:47

## 2021-04-08 RX ADMIN — SODIUM CHLORIDE, POTASSIUM CHLORIDE, SODIUM LACTATE AND CALCIUM CHLORIDE: 600; 310; 30; 20 INJECTION, SOLUTION INTRAVENOUS at 12:52

## 2021-04-08 RX ADMIN — SODIUM CHLORIDE, POTASSIUM CHLORIDE, SODIUM LACTATE AND CALCIUM CHLORIDE: 600; 310; 30; 20 INJECTION, SOLUTION INTRAVENOUS at 13:39

## 2021-04-08 RX ADMIN — FENTANYL CITRATE 50 MCG: 50 INJECTION INTRAMUSCULAR; INTRAVENOUS at 15:05

## 2021-04-08 RX ADMIN — FENTANYL CITRATE 25 MCG: 50 INJECTION, SOLUTION INTRAMUSCULAR; INTRAVENOUS at 14:20

## 2021-04-08 RX ADMIN — ONDANSETRON 4 MG: 2 INJECTION INTRAMUSCULAR; INTRAVENOUS at 14:35

## 2021-04-08 RX ADMIN — FENTANYL CITRATE 50 MCG: 50 INJECTION INTRAMUSCULAR; INTRAVENOUS at 15:20

## 2021-04-08 ASSESSMENT — PAIN SCALES - GENERAL
PAINLEVEL_OUTOF10: 5
PAINLEVEL_OUTOF10: 8
PAINLEVEL_OUTOF10: 5
PAINLEVEL_OUTOF10: 5
PAINLEVEL_OUTOF10: 8
PAINLEVEL_OUTOF10: 4
PAINLEVEL_OUTOF10: 8
PAINLEVEL_OUTOF10: 5
PAINLEVEL_OUTOF10: 5

## 2021-05-26 VITALS
WEIGHT: 293 LBS | OXYGEN SATURATION: 100 % | RESPIRATION RATE: 15 BRPM | BODY MASS INDEX: 44.41 KG/M2 | HEIGHT: 68 IN | SYSTOLIC BLOOD PRESSURE: 145 MMHG | DIASTOLIC BLOOD PRESSURE: 81 MMHG | HEART RATE: 70 BPM | TEMPERATURE: 96.6 F

## 2021-10-01 PROBLEM — J30.81 ALLERGIC RHINITIS DUE TO ANIMAL HAIR AND DANDER: Status: ACTIVE | Noted: 2021-10-01

## 2021-10-01 PROBLEM — E78.5 HYPERLIPIDEMIA LDL GOAL <100: Status: ACTIVE | Noted: 2021-10-01

## 2021-10-04 ENCOUNTER — ORDER TRANSCRIPTION (OUTPATIENT)
Dept: PHYSICAL THERAPY | Facility: HOSPITAL | Age: 46
End: 2021-10-04

## 2021-10-04 DIAGNOSIS — C79.51 BONE METASTASES (HCC): ICD-10-CM

## 2021-10-04 DIAGNOSIS — R60.0 EDEMA OF LEFT UPPER ARM: ICD-10-CM

## 2021-10-04 DIAGNOSIS — C50.912 BREAST CANCER, STAGE 3, LEFT (HCC): Primary | ICD-10-CM

## 2021-10-06 ENCOUNTER — OFFICE VISIT (OUTPATIENT)
Dept: PHYSICAL THERAPY | Facility: HOSPITAL | Age: 46
End: 2021-10-06
Attending: INTERNAL MEDICINE
Payer: MEDICAID

## 2021-10-06 DIAGNOSIS — R60.0 EDEMA OF LEFT UPPER ARM: ICD-10-CM

## 2021-10-06 DIAGNOSIS — C79.51 BONE METASTASES (HCC): ICD-10-CM

## 2021-10-06 DIAGNOSIS — C50.912 BREAST CANCER, STAGE 3, LEFT (HCC): ICD-10-CM

## 2021-10-06 PROCEDURE — 97163 PT EVAL HIGH COMPLEX 45 MIN: CPT | Performed by: PHYSICAL THERAPIST

## 2021-10-06 PROCEDURE — 97140 MANUAL THERAPY 1/> REGIONS: CPT | Performed by: PHYSICAL THERAPIST

## 2021-10-06 NOTE — PATIENT INSTRUCTIONS
Bandaging:  start with a clean arm (shower/bath)  - Lotion arm  - Put on bandage liner “the blue beast”  - Start with the small roll  o Secure one lap around the wrist the go to the hand  o 3 laps around the hand  o Then one lap wrist, one lap hand, then b

## 2021-10-06 NOTE — PROGRESS NOTES
UE LYMPHEDEMA EVALUATION:     Referring Physician: Dr. Bree Fajardo  Diagnosis: lymphedema I89.0    Date of onset: 9/20/2021 Evaluation Date: 10/6/2021     PATIENT SUMMARY   Shiela Burton is a 42year old female who presents to therapy today w/ complaints of swe liver/lungs  Education or treatment limitation: mets, neuropathy making it difficult to ana rosa garments, financial  Rehab Potential:good    ASSESSMENT   Carmina Ortega presents to [facility] evaluation with significant swelling LUE but without skin bejarano MEASUREMENT D 34.8 29.8 29.2 28 29   MEASUREMENT E 38.5 34.4 33 31.2 32.4   MEASUREMENT F 38.9 35.2 33.9 32 32.9   MEASUREMENT G 45.8 42.5 37.1 36.8 35.2   MEASUREMENT H 50.3 49 43.8 41.6 41.3   MEASUREMENT I 53.8 51.2 51.1 46 46.5   MEASUREMENT J 54 54. supplies:  Purchase add'l comprilan   Discussed various vendors to purchase supplies, patient chose [employer]             There Ex (  minutes):  - pt wanting to start ROM exercises, provided pt w/ handout of lymphedema remedial exercises Therapeutic Exercise, Neuromuscular Re-education, Orthotic Management and Training      Patient/Family/Caregiver was advised of these findings, precautions, and treatment options and has agreed to actively participate in planning and for this course of car

## 2021-10-08 ENCOUNTER — OFFICE VISIT (OUTPATIENT)
Dept: PHYSICAL THERAPY | Facility: HOSPITAL | Age: 46
End: 2021-10-08
Attending: INTERNAL MEDICINE
Payer: MEDICAID

## 2021-10-08 PROCEDURE — 97140 MANUAL THERAPY 1/> REGIONS: CPT

## 2021-10-08 NOTE — PROGRESS NOTES
Referring Physician: Dr. Mohini Tierney  Diagnosis: lymphedema I89.0     Date of onset: 9/20/2021 Evaluation Date: 10/6/2021       Physical Therapy Lymphedema Daily Note    Precautions:  Recently had chemo, mets to liver/lungs  Education or treatment limitation: 10/6/2021 2/3/2020 12/10/2019 11/22/2017 8/9/2017   DATE MEASURED 10/6/2021 - 12/10/2019 11/22/2017 8/9/2017   LOCATION/MEASUREMENTS LUE LUE LUE LUE LUE   PATIENT POSITION  supine supine supine SUPINE, 1 PILLOW Supine w/1pillow   LIMB POSITION 75 deg abduc impairment, 100% total impairment)        Today’s Treatment and Response:   Date 10/6/2021 Date: 10/8/2021 Date: * Date: * Date: * Date: *   Visit # 1/18  Units: 2/72  Auth exp: 12/5/2021  [facility]       Visit # 2/18  Units: 8/72  Auth exp: 12/ of skin care.  Discussed and demonstrated bandaging and compression options including various vendors that can be utilized                  Assessment: Pt needs reinforcement on proper bandaging and that if bandages slip down she needs to remove and re-do t

## 2021-10-12 ENCOUNTER — OFFICE VISIT (OUTPATIENT)
Dept: PHYSICAL THERAPY | Facility: HOSPITAL | Age: 46
End: 2021-10-12
Attending: INTERNAL MEDICINE
Payer: MEDICAID

## 2021-10-12 PROCEDURE — 97140 MANUAL THERAPY 1/> REGIONS: CPT

## 2021-10-12 NOTE — PATIENT INSTRUCTIONS
CARE FOR COMPRESSION BANDAGES:      Bandages are best washed in warm water (between 108 – 140oF); When bandages are air-dried, it is important not to pull, squeeze or wring out the residual water from the material excessively.  Rolling up the compressi

## 2021-10-12 NOTE — PROGRESS NOTES
Referring Physician: Dr. Bree Fajardo  Diagnosis: lymphedema I89.0     Date of onset: 9/20/2021 Evaluation Date: 10/6/2021       Physical Therapy Lymphedema Daily Note    Precautions:  Recently had chemo, mets to liver/lungs  Education or treatment limitation: 10/6/2021 2/3/2020 12/10/2019 11/22/2017 8/9/2017   DATE MEASURED 10/6/2021 - 12/10/2019 11/22/2017 8/9/2017   LOCATION/MEASUREMENTS LUE LUE LUE LUE LUE   PATIENT POSITION  supine supine supine SUPINE, 1 PILLOW Supine w/1pillow   LIMB POSITION 75 deg abduc impairment, 100% total impairment)        Today’s Treatment and Response:   Date 10/6/2021 Date: 10/8/2021 Date: 10/12/2021 Date: * Date: * Date: *   Visit # 1/18  Units: 2/72  Auth exp: 12/5/2021  [facility]       Visit # 2/18  Units: 8/72  Auth reduces. Compression:  -fabricated additional hand velcro wrap for pt to wear over her compression glove to provide added compression to dorsum of hand (area of swelling)  -reviewed compression bandages to order.   Decided to have pt order 3-alejandrina ball Complete Decongestive Therapy: Manual Therapy, Self-Care/Home Management Training, Therapeutic Exercise, Neuromuscular Re-education, Orthotic Management and Training        Charges: MM6  Total Timed Treatment: 83 min  Total Treatment Time:83 min

## 2021-10-14 ENCOUNTER — OFFICE VISIT (OUTPATIENT)
Dept: PHYSICAL THERAPY | Facility: HOSPITAL | Age: 46
End: 2021-10-14
Attending: INTERNAL MEDICINE
Payer: MEDICAID

## 2021-10-14 DIAGNOSIS — C50.919 BREAST CANCER (HCC): ICD-10-CM

## 2021-10-14 DIAGNOSIS — I89.0 LYMPHEDEMA OF LEFT ARM: Primary | ICD-10-CM

## 2021-10-14 PROCEDURE — 97140 MANUAL THERAPY 1/> REGIONS: CPT | Performed by: PHYSICAL THERAPIST

## 2021-10-14 NOTE — PROGRESS NOTES
Referring Physician: Dr. Ivone Shaver  Diagnosis: lymphedema I89.0     Date of onset: 9/20/2021 Evaluation Date: 10/6/2021       Physical Therapy Lymphedema Daily Note    Precautions:  Recently had chemo, mets to liver/lungs  Education or treatment limitation: 38.9 35.2 33.9 32 32.9   MEASUREMENT G 43.2 45.8 42.5 37.1 36.8 35.2   MEASUREMENT H 47.8 50.3 49 43.8 41.6 41.3   MEASUREMENT I 51.8 53.8 51.2 51.1 46 46.5   MEASUREMENT J 52.9 54 54.3 53.2 48 49.5   MEASUREMENT K 54.3 55.3 - - 49 51.8    TOTAL VOLUME  65 left     Arm Volume Measurements:   Lymphedema Calculations 10/6/2021 2/3/2020 12/10/2019 11/22/2017 8/9/2017   DATE MEASURED 10/6/2021 - 12/10/2019 11/22/2017 8/9/2017   LOCATION/MEASUREMENTS LUE LUE LUE LUE LUE   PATIENT POSITION  supine supine supine FERRERA Scale: Score: LLIS Score Evaluation: 38 % impaired.  (0% is no impairment, 100% total impairment)        Today’s Treatment and Response:   Date 10/6/2021 Date: 10/8/2021 Date: 10/12/2021 Date: 10/14/2021 Date: * Date: *   Visit # 1/18  Units: 2/72  Auth exp using basic pump. Discussed use of pump daily when swelling is present and can discuss plan for maintenance phase as swelling reduces.     Compression:  -fabricated additional hand velcro wrap for pt to wear over her compression glove to provide added com Self-Care:  Management/Education: Explained Lymphedema diagnosis; informed patient of lymphedema precautions and risk reduction practices, and importance of skin care.  Discussed and demonstrated bandaging and compression options including various vendors

## 2021-10-19 ENCOUNTER — OFFICE VISIT (OUTPATIENT)
Dept: PHYSICAL THERAPY | Facility: HOSPITAL | Age: 46
End: 2021-10-19
Attending: INTERNAL MEDICINE
Payer: MEDICAID

## 2021-10-19 PROCEDURE — 97140 MANUAL THERAPY 1/> REGIONS: CPT

## 2021-10-19 NOTE — PROGRESS NOTES
Referring Physician: Dr. Maico Barry  Diagnosis: lymphedema I89.0     Date of onset: 9/20/2021 Evaluation Date: 10/6/2021       Physical Therapy Lymphedema Daily Note    Precautions:  Recently had chemo, mets to liver/lungs  Education or treatment limitation: 34.8 29.8 29.2 28 29   MEASUREMENT E 36.8 38.5 34.4 33 31.2 32.4   MEASUREMENT F 38.8 38.9 35.2 33.9 32 32.9   MEASUREMENT G 43.2 45.8 42.5 37.1 36.8 35.2   MEASUREMENT H 47.8 50.3 49 43.8 41.6 41.3   MEASUREMENT I 51.8 53.8 51.2 51.1 46 46.5   MEASUREMENT swelling L trunk (difficult to fully assess due to obesity)     Stemmer's Sign: + left     Arm Volume Measurements:   Lymphedema Calculations 10/6/2021 2/3/2020 12/10/2019 11/22/2017 8/9/2017   DATE MEASURED 10/6/2021 - 12/10/2019 11/22/2017 8/9/2017   LOC 0.15342 -8.79590 9.03152 -4.75881                           Lymphedema Life Impact Scale: Score: LLIS Score Evaluation: 38 % impaired.  (0% is no impairment, 100% total impairment)        Today’s Treatment and Response:   Date 10/6/2021 Date: 10/8/2021 Date bandaging at night.  Manual therapy (83 minutes)    Pump Demo provided today:  Clinician present to discuss self MLD steps to perform before using basic pump.    Discussed use of pump daily when swelling is present and can discuss plan for maintenance phase sequence, abd sequence, R axilla, A-A (ant and post), L inguinal, L A-I supine and sidely , L axilla, LUE      Compression:  -caresia liner  -foam added at hand for additional compression  -2 comprilan bandages over caresia liner (15m of bandages)  -review

## 2021-10-22 ENCOUNTER — OFFICE VISIT (OUTPATIENT)
Dept: PHYSICAL THERAPY | Facility: HOSPITAL | Age: 46
End: 2021-10-22
Attending: INTERNAL MEDICINE
Payer: MEDICAID

## 2021-10-22 PROCEDURE — 97140 MANUAL THERAPY 1/> REGIONS: CPT

## 2021-10-22 NOTE — PATIENT INSTRUCTIONS
1. Care Instructions: (BLUE MONSTER) Caresia garments can be machine washed and dried. Do not use additives like bleach or fabric softener.         2. Bandages (ROLLED BEIGE)  are best washed in warm water (between 108 – 140oF);        When bandages are

## 2021-10-22 NOTE — PROGRESS NOTES
Referring Physician: Dr. Helen Cantrell  Diagnosis: lymphedema I89.0     Date of onset: 9/20/2021 Evaluation Date: 10/6/2021       Physical Therapy Lymphedema Daily Note    Precautions:  Recently had chemo, mets to liver/lungs  Education or treatment limitation: MEASUREMENT B 22.3 26.3 21.7 20.5 19.5 20.5   MEASUREMENT C 27.5 30.6 25.7 25 24.2 24.9   MEASUREMENT D 31.6 34.8 29.8 29.2 28 29   MEASUREMENT E 36.8 38.5 34.4 33 31.2 32.4   MEASUREMENT F 38.8 38.9 35.2 33.9 32 32.9   MEASUREMENT G 43.2 45.8 42.5 37.1    Posture: Rounded shld     Edema/Tissue Observations:    - swelling LUE, + pitting  - mild swelling L trunk (difficult to fully assess due to obesity)     Stemmer's Sign: + left     Arm Volume Measurements:   Lymphedema Calculations 10/6/20 48.9 48.8    TOTAL VOLUME  8331.24415 4860.7172 7271.16878 2934.21715 5197.25889   % DIFFERENCE 22.47421 5.68017 -2.02424 3.16757 -0.70931                           Lymphedema Life Impact Scale: Score: LLIS Score Evaluation: 38 % impaired.  (0% is no impair garments  2) Pt to be independent with self MLD, ROM exercises, and donning/doffing compression bandages/garments to facilitate swelling reduction and to be independent at self management once discharged  3) Pt to tolerate 15 minutes of remedial exercises

## 2021-10-25 ENCOUNTER — OFFICE VISIT (OUTPATIENT)
Dept: PHYSICAL THERAPY | Facility: HOSPITAL | Age: 46
End: 2021-10-25
Attending: INTERNAL MEDICINE
Payer: MEDICAID

## 2021-10-25 PROCEDURE — 97140 MANUAL THERAPY 1/> REGIONS: CPT

## 2021-10-25 NOTE — PROGRESS NOTES
Referring Physician: Dr. Trudee Lanes  Diagnosis: lymphedema I89.0     Date of onset: 9/20/2021 Evaluation Date: 10/6/2021       Physical Therapy Lymphedema Daily Note    Precautions:  Recently had chemo, mets to liver/lungs  Education or treatment limitation: 36.5 38.8 38.9 35.2 33.9 32   MEASUREMENT G 41.2 43.2 45.8 42.5 37.1 36.8   MEASUREMENT H 46.8 47.8 50.3 49 43.8 41.6   MEASUREMENT I 51 51.8 53.8 51.2 51.1 46   MEASUREMENT J 52.5 52.9 54 54.3 53.2 48   MEASUREMENT K - 54.3 55.3 - - 49    TOTAL VOLUME  51 360 ml   LEFT HAND VOLUME - 370 - - - 355 ml   MEASUREMENT A 19.8 20.8 18.2 17.5 16.6 17.7   MEASUREMENT B 22.3 26.3 21.7 20.5 19.5 20.5   MEASUREMENT C 27.5 30.6 25.7 25 24.2 24.9   MEASUREMENT D 31.6 34.8 29.8 29.2 28 29   MEASUREMENT E 36.8 38.5 34.4 33 fingers/hands     AROM/Strength:                 Shoulder AROM: WFL               Shoulder strength: grossly 4/5                    Posture: Rounded shld     Edema/Tissue Observations:    - swelling LUE, + pitting  - mild swelling L trunk (difficult to ful 44.5 38.8 43   MEASUREMENT I 49.2 51 51.5 43.5 45   MEASUREMENT J 51.7 53.9 54.3 48.9 49.3   MEASUREMENT K 52.8 - - 48.9 48.8    TOTAL VOLUME  3795.57913 9360.7872 8292.24629 1908.92066 5197.51014   % DIFFERENCE 22.10979 5.55604 -2.19247 3.75044 -0.65257 forearm/hand and fingers, areas of pitting edema)    Compression:  -assisted pt with donning custom compression sleeve and glove.  (pt has another appt this morning and she asked if she could wear the compression sleeve opposed to the caresia liner and ban

## 2021-10-26 ENCOUNTER — APPOINTMENT (OUTPATIENT)
Dept: PHYSICAL THERAPY | Facility: HOSPITAL | Age: 46
End: 2021-10-26
Attending: INTERNAL MEDICINE
Payer: MEDICAID

## 2021-10-27 ENCOUNTER — OFFICE VISIT (OUTPATIENT)
Dept: PHYSICAL THERAPY | Facility: HOSPITAL | Age: 46
End: 2021-10-27
Attending: INTERNAL MEDICINE
Payer: MEDICAID

## 2021-10-27 PROCEDURE — 97140 MANUAL THERAPY 1/> REGIONS: CPT

## 2021-10-27 NOTE — PROGRESS NOTES
Referring Physician: Dr. Ching Oates  Diagnosis: lymphedema I89.0     Date of onset: 9/20/2021 Evaluation Date: 10/6/2021       Physical Therapy Lymphedema Daily Note    Precautions:  Recently had chemo, mets to liver/lungs  Education or treatment limitation: 20.8 18.2 17.5 16.6   MEASUREMENT B 22.3 22.3 26.3 21.7 20.5 19.5   MEASUREMENT C 27 27.5 30.6 25.7 25 24.2   MEASUREMENT D 32.3 31.6 34.8 29.8 29.2 28   MEASUREMENT E 35.5 36.8 38.5 34.4 33 31.2   MEASUREMENT F 36.5 38.8 38.9 35.2 33.9 32   MEASUREMENT G abduction 75 deg abduction 75 DEG ABDUCTION   STARTING POINT 16cm 17cm from 3rd cuticle 17cm from 3rd cuticle 17cm from 3rd cuticle 17cm from 3rd cuticle 17CM   RIGHT HAND VOLUME 365ml - 465 - - -   LEFT HAND VOLUME 440ml - 370 - - -   MEASUREMENT A 19.5 1 well as R hand glove (pt to return glove to clinican  )  10/6/2021 (Evaluation):  Sensation:  Reports numbness B fingers/hands     AROM/Strength:                 Shoulder AROM: WFL               Shoulder strength: grossly 4/5                    Posture: Ro 31.5 32   MEASUREMENT F 34.7 33.5 33.5 31 32.5   MEASUREMENT G 41.5 41.2 39.8 34.5 38.4   MEASUREMENT H 45 46.2 44.5 38.8 43   MEASUREMENT I 49.2 51 51.5 43.5 45   MEASUREMENT J 51.7 53.9 54.3 48.9 49.3   MEASUREMENT K 52.8 - - 48.9 48.8    TOTAL VOLUME  5 Manual therapy (63 minutes)   Manaul therapy 70 minutes)  MLD: neck sequence, abd sequence, R axilla, A-A (ant and post), L inguinal, L A-I supine and sidely , L axilla, LUE  (extra time spent on forearm/hand and fingers, areas of pitting edema)    Lizet visits over a 90 day period. Frequency will decrease as symptoms improve.      Treatment will include: Complete Decongestive Therapy: Manual Therapy, Self-Care/Home Management Training, Therapeutic Exercise, Neuromuscular Re-education, Orthotic Management

## 2021-11-08 ENCOUNTER — TELEPHONE (OUTPATIENT)
Dept: PHYSICAL THERAPY | Facility: HOSPITAL | Age: 46
End: 2021-11-08

## 2021-11-08 ENCOUNTER — APPOINTMENT (OUTPATIENT)
Dept: PHYSICAL THERAPY | Facility: HOSPITAL | Age: 46
End: 2021-11-08
Attending: INTERNAL MEDICINE
Payer: MEDICAID

## 2021-11-08 NOTE — TELEPHONE ENCOUNTER
Pt did not show for her 7:30 therapy appt. Attempted to call- left VM message letting pt know there was a 10:30 am appt available later this morning if she still wanted to come.  Instr pt to call back to confirm if able to come later this AM or just return

## 2021-11-09 ENCOUNTER — OFFICE VISIT (OUTPATIENT)
Dept: PHYSICAL THERAPY | Facility: HOSPITAL | Age: 46
End: 2021-11-09
Attending: INTERNAL MEDICINE
Payer: MEDICAID

## 2021-11-09 PROCEDURE — 97140 MANUAL THERAPY 1/> REGIONS: CPT | Performed by: PHYSICAL THERAPIST

## 2021-11-09 NOTE — PROGRESS NOTES
Referring Physician: Dr. Thalia Bear  Diagnosis: lymphedema I89.0     Date of onset: 9/20/2021 Evaluation Date: 10/6/2021       Physical Therapy Lymphedema Daily Note    Precautions:  Recently had chemo, mets to liver/lungs  Education or treatment limitation: right rib/mm pain      10/27/2021  -pitting edema in hand and forearm  -finger nails brittle, thin from chemo  -poor hand strength making it difficult to ana rosa/doff compression garments - pt has purchased donning aids such as prosthetic donning lotion and d MEASUREMENT G 41.3 41.6 41.5 41.2 39.8 34.5   MEASUREMENT H 44.8 45.8 45 46.2 44.5 38.8   MEASUREMENT I 50.5 49.1 49.2 51 51.5 43.5   MEASUREMENT J 53 51.3 51.7 53.9 54.3 48.9   MEASUREMENT K - 52.8 52.8 - - 48.9    TOTAL VOLUME  [id number] 66 MEASUREMENT A 17.4 17.7 17.8 17 17 16.6   MEASUREMENT B 20.5 22 21.9 20.5 20.3 20.8   MEASUREMENT C 25 26.7 25.3 25.6 24.8 24.5   MEASUREMENT D 29.2 30.4 29.5 30.5 29 28.7   MEASUREMENT E 32.5 32.8 33.3 33 32.5 31.5   MEASUREMENT F 33.5 34.4 34.7 33.5 33 B 26.3 21.7 20.5 19.5 20.5   MEASUREMENT C 30.6 25.7 25 24.2 24.9   MEASUREMENT D 34.8 29.8 29.2 28 29   MEASUREMENT E 38.5 34.4 33 31.2 32.4   MEASUREMENT F 38.9 35.2 33.9 32 32.9   MEASUREMENT G 45.8 42.5 37.1 36.8 35.2   MEASUREMENT H 50.3 49 43.8 41.6 and post), L inguinal, L A-I supine and sidely , L axilla, LUE      Compression:  -caresia liner  -foam added at hand for additional compression  -2 comprilan bandages over caresia liner (15m of bandages)  -reviewed that when new bandages arrive she must w glove.  Pt leaving today's appt to go directly to get measured for new compression sleeve and glove. Pt has order in hand that specifies ELVAREX CCL2 sleeve and Glove as well as night time garment.      Manual therapy (55 min)    MLD (in sitting): neck seq Complete Decongestive Therapy: Manual Therapy, Self-Care/Home Management Training, Therapeutic Exercise, Neuromuscular Re-education, Orthotic Management and Training        Charges: mm4 Total Timed Treatment: 55 min  Total Treatment Time: 55 min

## 2021-11-19 ENCOUNTER — APPOINTMENT (OUTPATIENT)
Dept: PHYSICAL THERAPY | Facility: HOSPITAL | Age: 46
End: 2021-11-19
Attending: INTERNAL MEDICINE
Payer: MEDICAID

## 2021-11-24 ENCOUNTER — APPOINTMENT (OUTPATIENT)
Dept: PHYSICAL THERAPY | Facility: HOSPITAL | Age: 46
End: 2021-11-24
Attending: INTERNAL MEDICINE
Payer: MEDICAID

## 2021-12-07 ENCOUNTER — APPOINTMENT (OUTPATIENT)
Dept: PHYSICAL THERAPY | Facility: HOSPITAL | Age: 46
End: 2021-12-07
Attending: INTERNAL MEDICINE
Payer: MEDICAID

## 2021-12-23 ENCOUNTER — APPOINTMENT (OUTPATIENT)
Dept: PHYSICAL THERAPY | Facility: HOSPITAL | Age: 46
End: 2021-12-23
Attending: INTERNAL MEDICINE
Payer: MEDICAID

## 2021-12-27 NOTE — PROGRESS NOTES
Discharge Summary    Patient last seen for lymphedema therapy on 11/9/2021. Future appts were cancelled due to pt being hospitalized. Patient has not returned for add'l treatment since discharged from the hospital.     Discharge from PT at this time.   Arvind Cornell

## 2022-09-22 NOTE — ANESTHESIA PREPROCEDURE EVALUATION
Anesthesia PreOp Note    HPI:     Priscilla Rosales is a 38year old female who presents for preoperative consultation requested by: Tonia Fishman MD    Date of Surgery: 10/26/2017    Procedure(s):  BREAST LUMPECTOMY  SENTINEL LYMPH NODE BIOPSY  AXILLAR 03/21/2017      Cyst of right ovary         Date Noted: 03/21/2017      Uterine leiomyoma, unspecified location         Date Noted: 03/21/2017      Malignant neoplasm of left female breast [facility])         Date Noted: 03/14/2017      Breast cancer, stage 3, l total) by mouth once a week. Disp: 12 capsule Rfl: 3 10/8/2017   loratadine (CLARITIN) 10 MG Oral Tab Take 1 tablet (10 mg total) by mouth daily.  (Patient taking differently: Take 10 mg by mouth daily as needed.  ) Disp: 30 tablet Rfl: 11 10/17/2017   TraM Not on file     Other Topics Concern   None on file     Social History Narrative   None on file       Available pre-op labs reviewed.     Lab Results  Component Value Date   WBC 7.6 10/11/2017   WBC 6.18 10/02/2017   WBC 6.87 09/21/2017   RBC 4.11 10/11/201 GI/Hepatic/Renal - negative ROS     Endo/Other    (+) arthritis  Abdominal  - normal exam             Anesthesia Plan:   ASA:  2  Plan:   General  Airway:  LMA  Post-op Pain Management: Oral pain medication and IV analgesics  Informed Consent Plan and Risk Simple: Patient demonstrates quick and easy understanding

## 2024-06-07 NOTE — CONSULTS
Called and advised patient of Feng message. Patient advise to stop the magnesium and see if that helps with symptoms. Patient is in agreement with plan. Patient stated she will call office with an update on any changes.   Patient presents with: Follow - Up: Follow up breast/ discuss poss port removal      HPI:    Karina Lamas is a 45year old Black  female. The patient presents to the office for new left breast mass. It has been present since 2 weeks.  Previous breast pro breast, axillary lymph node, ultrasound-guided biopsy:  -Invasive ductal carcinoma.  -Ronks grade 3 (Tubules: 3, Nuclear: 2, Mitoses: 3).    -Longest length of invasive carcinoma 11 mm; 60% of submitted tissue.  -Lymphoid tissue not visualized.       uptake in the region of the right adnexa. This is suspicious for either a  synchronous or metastatic lesion.  Further evaluation with pelvic MRI is recommended if indicated  Clinically.        3/29/2017: Patient presents for follow-up evaluation for port in axillary metastasis. Patient is receiving chemotherapy as well as Herceptin   Patient completed chemotherapy 8/31/2017. Patient for preoperative discussion. Patient presented at multidisciplinary breast tumor board.   Recommendation was for consideration with focused sentinel lymph node biopsy on 10/26/2017. Patient without complaints.     OPERATION DATE:  10/26/2017     OPERATIVE REPORT     PREOPERATIVE DIAGNOSIS:  Preoperative stage III, left breast cancer.   POSTOPERATIVE DIAGNOSIS:  Preoperative stage equivocal HER-2/kaiden FISH analysis.  The left axillary lymph node metastasis showed strong ER positivity (90%), strong focal NV positivity (15%), unfavorable Ki-67 (45%) and equivocal HER-2/kaiden FISH analysis.  The patient is status post preoperative neoadjuv fibrosis.   · No evidence of atypical epithelial hyperplasia, carcinoma in situ, infiltrating carcinoma, or other malignant neoplastic infiltrates identified.     E.  Left breast, lateral margin; wider excision:  · Portion of breast tissue demonstrating sca 10/27/17 at 6:48 p.m.      Electronically signed by Deny Giron MD on 10/27/2017 at  8:07 PM         11/22/2017: Patient presents for follow-up evaluation of stage III left breast cancer with axillary metastasis.   Patient status post left breast lumpec No ultrasonographic correlation. Recommend further evaluation with tomosynthesis guided  stereotactic biopsy. Excisional biopsy is also a consideration as clinically indicated given history  of atypical ductal hyperplasia.   2. Previously biopsied right zee on 6/5/2018  Patient is without complaints.     11/7/2018:Patient presents for follow-up evaluation of stage III left breast cancer with axillary metastasis. Patient status post left breast lumpectomy with focused sentinel lymph node biopsy on 10/26/2017. of   Left breast infiltrating ductal carcinoma  Patient with right breast mass x3 with adh          POSTOPERATIVE DIAGNOSIS: Same Left   breast mass x 2           PROCEDURE PERFORMED: Right breast lumpectomy with wire localization   x2     SURGEON: Viktor Castellanos examination.    ANY FURTHER EVALUATION SHOULD BE BASED ON CLINICAL ASSESSMENT  The Department of Radiology will inform the patient of their results by letter as well as contact  the patient for needed follow-up studies.     Gyne History:  Menarche at age 5 Clem Lopes MD at [facility]   • BENIGN BIOPSY RIGHT   03/2017   • BREAST BIOPSY/LUMPECTOMY  WITH NEEDLE LOCALIZATION UNILATERAL Right 6/5/2018     Performed by Christiane Shipman MD at [address] [place] [address]   • BREAST LUMPECTOMY Left 10/26/2017     Perf symptoms of depression or anxiety  HEMATOLOGY: denies hx anemia; denies bruising or excessive bleeding  ENDOCRINE: denies excessive thirst or urination; denies unexpected wt gain or wt loss  ALLERGY/IMM.: denies food or seasonal allergies     PHYSICAL EXAM palpable left breast mass as well as left axillary adenopathy. Patient with stage III left breast cancer positive axillary metastasis. Patient is receiving chemotherapy as well as Herceptin at this point.   Patient has seen medical oncology as well as radi understands, consents, and wishes to proceed with surgery. We will schedule removal of subcutaneous port under  MAC anesthesia at [address] [place] [address] on Thursday, July 11, 2019.   Due to the patient's BMI of 51 her surgery cannot be completed at the [facility] and must be comple

## (undated) DEVICE — Device

## (undated) DEVICE — DRAPE C-ARM UNIVERSAL

## (undated) DEVICE — GAMMEX® PI HYBRID SIZE 7.5, STERILE POWDER-FREE SURGICAL GLOVE, POLYISOPRENE AND NEOPRENE BLEND: Brand: GAMMEX

## (undated) DEVICE — SUTURE VICRYL 2-0

## (undated) DEVICE — 12 ML SYRINGE LUER-LOCK TIP: Brand: MONOJECT

## (undated) DEVICE — LAWSON - KIT MINOR BASIN GSA

## (undated) DEVICE — SUTURE CHROMIC GUT 3-0 SH

## (undated) DEVICE — CHLORAPREP 26ML APPLICATOR

## (undated) DEVICE — SOL IRRIG NACL 1000CC BTL .9%

## (undated) DEVICE — MINOR GENERAL: Brand: MEDLINE INDUSTRIES, INC.

## (undated) DEVICE — ADHESIVE MASTISOL 2/3CC VL

## (undated) DEVICE — STERILE LATEX POWDER-FREE SURGICAL GLOVESWITH NITRILE COATING: Brand: PROTEXIS

## (undated) DEVICE — CLIP MED INTNL HMCLP TNTLM

## (undated) DEVICE — 3M™ STERI-STRIP™ BLEND TONE SKIN CLOSURES, B1557, TAN, 1/2 IN X 4 IN (12MM X 100MM), 6 STRIPS/ENVELOPE: Brand: 3M™ STERI-STRIP™

## (undated) DEVICE — 3M(TM) TEGADERM(TM) TRANSPARENT FILM DRESSING FRAME STYLE 1628: Brand: 3M™ TEGADERM™

## (undated) DEVICE — STERILE (10.2 X 147CM) TELESCOPICALLY-FOLDED COVER: Brand: CIV-FLEX™ TRANSDUCER COVER

## (undated) DEVICE — SUTURE VICRYL 3-0 SH

## (undated) DEVICE — SOL  .9 1000ML BTL

## (undated) DEVICE — DRAIN RESERVOIR RELIAVAC 100CC

## (undated) DEVICE — BLADE 11 SHRP BP SS SRG STRL

## (undated) DEVICE — CONTAINER SPEC STR 4OZ GRY LID

## (undated) DEVICE — GAUZE SPONGES: Brand: DEROYAL

## (undated) DEVICE — 3M™ STERI-STRIP™ REINFORCED ADHESIVE SKIN CLOSURES, R1547, 1/2 IN X 4 IN (12 MM X 100 MM), 6 STRIPS/ENVELOPE: Brand: 3M™ STERI-STRIP™

## (undated) DEVICE — SUTURE VICRYL 2-0 CT-1

## (undated) DEVICE — SAFESTEP 20G X 1.5 INCH WITH Y-SITE: Brand: PORT ACCESS NEEDLE

## (undated) DEVICE — DRAIN SILICONE FLAT 10MM

## (undated) DEVICE — UNDYED BRAIDED (POLYGLACTIN 910), SYNTHETIC ABSORBABLE SUTURE: Brand: COATED VICRYL

## (undated) DEVICE — SUTURE PROLENE 3-0 SH

## (undated) DEVICE — DRAPE SHEET TRANSVERSE LAP

## (undated) DEVICE — SUTURE SILK 2-0

## (undated) DEVICE — CLIP LG INTNL HMCLP TNTLM ESCP

## (undated) DEVICE — 3M™ IOBAN™ 2 ANTIMICROBIAL INCISE DRAPE 6640EZ: Brand: IOBAN™ 2

## (undated) DEVICE — LAWSON - BLADE POLYMER CTD STL #11

## (undated) DEVICE — LAP LIGASURE 5MM BLUNT

## (undated) DEVICE — IMPERVIOUS STOCKINETTE: Brand: DEROYAL

## (undated) DEVICE — INTENDED FOR TISSUE SEPARATION, AND OTHER PROCEDURES THAT REQUIRE A SHARP SURGICAL BLADE TO PUNCTURE OR CUT.: Brand: BARD-PARKER ® STAINLESS STEEL BLADES

## (undated) DEVICE — SUTURE CHROMIC GUT 2-0 SH

## (undated) DEVICE — SUTURE VLOC 90 3-0 23\" 0034

## (undated) DEVICE — SUTURE PROLENE 2-0 SH

## (undated) DEVICE — 3M™ IOBAN™ 2 ANTIMICROBIAL INCISE DRAPE 6650EZ: Brand: IOBAN™ 2

## (undated) DEVICE — SUTURE VICRYL 3-0 J442H

## (undated) DEVICE — LAWSON - CONTAINER SPCMN STL 5OZ

## (undated) DEVICE — 20 ML SYRINGE LUER-LOCK TIP: Brand: MONOJECT

## (undated) NOTE — LETTER
[address], [place], [place]     AUTHORIZATION FOR SURGICAL OPERATION OR PROCEDURE    1.  I hereby authorize                                                  my Physician(s) and whomever may be designated as the doctor's As own blood, or a directed donor transfusion, I will discuss this with my Physician. 5. I consent to the photographing of procedure(s) to be performed for the purposes of advancing medicine, science and/or education, provided my identity is not revealed.  If _______________________________________________________________ ____________________________  (Witness signature)                                                                                                  (Date)                                (Time)

## (undated) NOTE — LETTER
[address] [place] [address] Rd, [place], [place]     AUTHORIZATION FOR SURGICAL OPERATION OR PROCEDURE    1.  I hereby authorize Dr. Narciso Maldonado MD, my Physician(s) and whomever may be designated as the doctor's Assistant, to perform t 5. I consent to the photographing of procedure(s) to be performed for the purposes of advancing medicine, science and/or education, provided my identity is not revealed.  If the procedure has been videotaped, the physician/surgeon will obtain the original v (Witness signature)                                                                                                  (Date)                                (Time)  STATEMENT OF PHYSICIAN My signature below affirms that prior to the time of the procedure;  I

## (undated) NOTE — IP AVS SNAPSHOT
[facility]    [address], [place], [place] ~ [phone]                Discharge Summary   3/20/2017    [id number]           Admission Information        Provider Department    3/20/2017 Karen Winter MD [facility] Pre Where to Get Your Medications      You can get these medications from any pharmacy     Bring a paper prescription for each of these medications    - TraMADol HCl 50 MG Tabs              Patient Instructions       DISCHARGE INSTRUCTIONS PORT INSERTION · Don’t drive while you are still taking opioid pain medication, and don’t drive u.ntil you are able to step firmly on the brake pedal without hesitation. · Ask others to help with chores and errands while you recover.   · Don’t lift anything heavier than  Influenza A, H1N1 Vaccine 11/25/2009    TDAP 10/31/2013      Recent Hematology Lab Results  (Last 3 results in the past 90 days)    WBC RBC Hemoglobin Hematocrit MCV MCH MCHC RDW Platelet MPV    (99/13/18)  7.14 (03/16/17)  4.95 (03/16/17)  12.8 (03/16/17 Medicaid plans. To get signed up and covered, please call [phone] and ask to get set up for an insurance coverage that is in-network with [employer].         Visit Information        Department Dept Phone    3/20/2017  9:41 AM [facility] Pre-Op

## (undated) NOTE — LETTER
[address] [place] [address] Rd, [place], [place]     AUTHORIZATION FOR SURGICAL OPERATION OR PROCEDURE    1.  I hereby authorize Dr. Marilou Steel MD, my Physician(s) and whomever may be designated as the doctor's Assistant, to perform t overload. In the event that I wish to have an autologous transfusion of my own blood, or a directed donor transfusion, I will discuss this with my Physician.   5. I consent to the photographing of procedure(s) to be performed for the purposes of advancing m (Responsible person in case of minor or unconscious patient)   (Relationship to Patient)    _______________________________________________________________ ____________________________  (Witness signature)

## (undated) NOTE — LETTER
[address], [place], [place]     AUTHORIZATION FOR SURGICAL OPERATION OR PROCEDURE    1.  I hereby authorize Dr. Leandro Monsalve MD, my Physician(s) and whomever may be designated as the doctor's Assistant, to perform t own blood, or a directed donor transfusion, I will discuss this with my Physician. 5. I consent to the photographing of procedure(s) to be performed for the purposes of advancing medicine, science and/or education, provided my identity is not revealed.  If _______________________________________________________________ ____________________________  (Witness signature)                                                                                                  (Date)                                (Time)

## (undated) NOTE — LETTER
[address] [place] [address] Rd, [place], [place]     AUTHORIZATION FOR SURGICAL OPERATION OR PROCEDURE    1.  I hereby authorize Dr. Diamond Lowery MD, my Physician(s) and whomever may be designated as the doctor's Assistant, to perform t own blood, or a directed donor transfusion, I will discuss this with my Physician. 5. I consent to the photographing of procedure(s) to be performed for the purposes of advancing medicine, science and/or education, provided my identity is not revealed.  If _______________________________________________________________ ____________________________  (Witness signature)                                                                                                  (Date)                                (Time)

## (undated) NOTE — LETTER
[address]  INFORMED CONSENT FOR TRANSFUSION OF BLOOD OR BLOOD PRODUCTS   My physician has informed me of the nature, purpose, benefits and risks of transfusion for blood and blood components that he/she may deem nece (Signature of Patient)                                       (Responsible party in case of Minor,                                                                            Incompetent, or unconscious Patient)  __________________________________    _______

## (undated) NOTE — LETTER
[facility]  Administration of Anesthesia  1.    Rick Nieves, or _________________________________ acting on his/her behalf, (Patient) (Dependent/Representative) request to receive anesthesia for my pending procedure/operation/treatment pressure, difficulty urinating, slowing of the baby's heart rate and headache. Rare risks include infections, high spinal         block, spinal bleeding, seizure, cardiac arrest and death.   7.   AWARENESS: I understand that it is possible (but unlike ________________________________________________  (Date) (Time)                                                                                                  (Responsible person in case of minor/ unconscious pt) /Relationship    My signature below affir

## (undated) NOTE — LETTER
[address] [place] [address] Rd, [place], [place]     AUTHORIZATION FOR SURGICAL OPERATION OR PROCEDURE    1.  I hereby authorize Dr. Leandro Monsalve MD, my Physician(s) and whomever may be designated as the doctor's Assistant, to perform t own blood, or a directed donor transfusion, I will discuss this with my Physician. 5. I consent to the photographing of procedure(s) to be performed for the purposes of advancing medicine, science and/or education, provided my identity is not revealed.  If _______________________________________________________________ ____________________________  (Witness signature)                                                                                                  (Date)                                (Time)

## (undated) NOTE — LETTER
[address], [place], [place]       AUTHORIZATION FOR SURGICAL OPERATION OR PROCEDURE    1.  I hereby authorize Dr. Leela Gilmore MD,  my Physician(s) and whomever may be designated as the doctor's Assistant, to [facility] donor transfusion, I will discuss this with my         Physician. 5.   I consent to the photographing of procedure(s) to be performed for the purposes of advancing medicine, science         and/or education, provided my identity is not revealed.  If the pr (Relationship to Patient)      _______________________________________________________________ ____________________________  (Witness signature)                                                                                                  (Date)